# Patient Record
Sex: FEMALE | Race: WHITE | HISPANIC OR LATINO | Employment: FULL TIME | URBAN - METROPOLITAN AREA
[De-identification: names, ages, dates, MRNs, and addresses within clinical notes are randomized per-mention and may not be internally consistent; named-entity substitution may affect disease eponyms.]

---

## 2019-01-08 ENCOUNTER — HOSPITAL ENCOUNTER (EMERGENCY)
Facility: CLINIC | Age: 19
Discharge: HOME | End: 2019-01-09
Attending: PHYSICIAN ASSISTANT | Admitting: PHYSICIAN ASSISTANT
Payer: MEDICAID

## 2019-01-08 ENCOUNTER — HOSPITAL ENCOUNTER (EMERGENCY)
Facility: CLINIC | Age: 19
End: 2019-01-08

## 2019-01-08 DIAGNOSIS — R11.10 VOMITING AND DIARRHEA: ICD-10-CM

## 2019-01-08 DIAGNOSIS — R19.7 VOMITING AND DIARRHEA: ICD-10-CM

## 2019-01-08 LAB
ALBUMIN UR-MCNC: NEGATIVE MG/DL
ANION GAP SERPL CALCULATED.3IONS-SCNC: 10 MMOL/L (ref 3–14)
APPEARANCE UR: ABNORMAL
BACTERIA #/AREA URNS HPF: ABNORMAL /HPF
BASOPHILS # BLD AUTO: 0 10E9/L (ref 0–0.2)
BASOPHILS NFR BLD AUTO: 0.2 %
BILIRUB UR QL STRIP: NEGATIVE
BUN SERPL-MCNC: 9 MG/DL (ref 7–30)
CALCIUM SERPL-MCNC: 8.5 MG/DL (ref 8.5–10.1)
CHLORIDE SERPL-SCNC: 100 MMOL/L (ref 94–109)
CO2 SERPL-SCNC: 25 MMOL/L (ref 20–32)
COLOR UR AUTO: YELLOW
CREAT SERPL-MCNC: 0.61 MG/DL (ref 0.52–1.04)
DIFFERENTIAL METHOD BLD: ABNORMAL
EOSINOPHIL # BLD AUTO: 0 10E9/L (ref 0–0.7)
EOSINOPHIL NFR BLD AUTO: 0.2 %
ERYTHROCYTE [DISTWIDTH] IN BLOOD BY AUTOMATED COUNT: 13.2 % (ref 10–15)
GFR SERPL CREATININE-BSD FRML MDRD: >90 ML/MIN/{1.73_M2}
GLUCOSE SERPL-MCNC: 143 MG/DL (ref 70–99)
GLUCOSE UR STRIP-MCNC: NEGATIVE MG/DL
HCG UR QL: NEGATIVE
HCT VFR BLD AUTO: 43 % (ref 35–47)
HGB BLD-MCNC: 14.2 G/DL (ref 11.7–15.7)
HGB UR QL STRIP: ABNORMAL
IMM GRANULOCYTES # BLD: 0.1 10E9/L (ref 0–0.4)
IMM GRANULOCYTES NFR BLD: 0.6 %
KETONES UR STRIP-MCNC: NEGATIVE MG/DL
LEUKOCYTE ESTERASE UR QL STRIP: ABNORMAL
LYMPHOCYTES # BLD AUTO: 0.6 10E9/L (ref 0.8–5.3)
LYMPHOCYTES NFR BLD AUTO: 3.5 %
MCH RBC QN AUTO: 28 PG (ref 26.5–33)
MCHC RBC AUTO-ENTMCNC: 33 G/DL (ref 31.5–36.5)
MCV RBC AUTO: 85 FL (ref 78–100)
MONOCYTES # BLD AUTO: 0.4 10E9/L (ref 0–1.3)
MONOCYTES NFR BLD AUTO: 2 %
MUCOUS THREADS #/AREA URNS LPF: PRESENT /LPF
NEUTROPHILS # BLD AUTO: 16.7 10E9/L (ref 1.6–8.3)
NEUTROPHILS NFR BLD AUTO: 93.5 %
NITRATE UR QL: NEGATIVE
NRBC # BLD AUTO: 0 10*3/UL
NRBC BLD AUTO-RTO: 0 /100
PH UR STRIP: 6 PH (ref 5–7)
PLATELET # BLD AUTO: 319 10E9/L (ref 150–450)
POTASSIUM SERPL-SCNC: 3.7 MMOL/L (ref 3.4–5.3)
RBC # BLD AUTO: 5.08 10E12/L (ref 3.8–5.2)
RBC #/AREA URNS AUTO: 5 /HPF (ref 0–2)
SODIUM SERPL-SCNC: 135 MMOL/L (ref 133–144)
SOURCE: ABNORMAL
SP GR UR STRIP: 1.02 (ref 1–1.03)
SQUAMOUS #/AREA URNS AUTO: 5 /HPF (ref 0–1)
UROBILINOGEN UR STRIP-MCNC: 0 MG/DL (ref 0–2)
WBC # BLD AUTO: 17.8 10E9/L (ref 4–11)
WBC #/AREA URNS AUTO: 3 /HPF (ref 0–5)

## 2019-01-08 PROCEDURE — 25000128 H RX IP 250 OP 636: Performed by: PHYSICIAN ASSISTANT

## 2019-01-08 PROCEDURE — 80048 BASIC METABOLIC PNL TOTAL CA: CPT | Performed by: PHYSICIAN ASSISTANT

## 2019-01-08 PROCEDURE — 99284 EMERGENCY DEPT VISIT MOD MDM: CPT | Mod: 25

## 2019-01-08 PROCEDURE — 81001 URINALYSIS AUTO W/SCOPE: CPT | Performed by: PHYSICIAN ASSISTANT

## 2019-01-08 PROCEDURE — 81025 URINE PREGNANCY TEST: CPT | Performed by: PHYSICIAN ASSISTANT

## 2019-01-08 PROCEDURE — 96361 HYDRATE IV INFUSION ADD-ON: CPT

## 2019-01-08 PROCEDURE — 85025 COMPLETE CBC W/AUTO DIFF WBC: CPT | Performed by: PHYSICIAN ASSISTANT

## 2019-01-08 PROCEDURE — 96374 THER/PROPH/DIAG INJ IV PUSH: CPT

## 2019-01-08 RX ORDER — ONDANSETRON 2 MG/ML
4 INJECTION INTRAMUSCULAR; INTRAVENOUS ONCE
Status: COMPLETED | OUTPATIENT
Start: 2019-01-08 | End: 2019-01-08

## 2019-01-08 RX ADMIN — SODIUM CHLORIDE 1000 ML: 9 INJECTION, SOLUTION INTRAVENOUS at 23:38

## 2019-01-08 RX ADMIN — ONDANSETRON 4 MG: 2 INJECTION INTRAMUSCULAR; INTRAVENOUS at 21:34

## 2019-01-08 RX ADMIN — SODIUM CHLORIDE 1000 ML: 9 INJECTION, SOLUTION INTRAVENOUS at 21:34

## 2019-01-08 ASSESSMENT — ENCOUNTER SYMPTOMS
DIZZINESS: 0
VOMITING: 1
CHILLS: 1
FEVER: 0
LIGHT-HEADEDNESS: 0
HEADACHES: 1
BLOOD IN STOOL: 0
DYSURIA: 1
NAUSEA: 1
DIARRHEA: 1
ABDOMINAL PAIN: 1

## 2019-01-08 NOTE — ED AVS SNAPSHOT
[facility] Emergency Department  [address]  [facility] [address]  Phone:  [phone]  Fax:  [phone]                                    Marilu Lozoya   MRN: [id number]    Department:  [facility] Emergency Department   Date of Visit:  1/8/2019           After Visit Summary Signature Page    I have received my discharge instructions, and my questions have been answered. I have discussed any challenges I see with this plan with the nurse or doctor.    ..........................................................................................................................................  Patient/Patient Representative Signature      ..........................................................................................................................................  Patient Representative Print Name and Relationship to Patient    ..................................................               ................................................  Date                                   Time    ..........................................................................................................................................  Reviewed by Signature/Title    ...................................................              ..............................................  Date                                               Time          22EPIC Rev 08/18

## 2019-01-09 VITALS
RESPIRATION RATE: 18 BRPM | DIASTOLIC BLOOD PRESSURE: 60 MMHG | OXYGEN SATURATION: 98 % | HEART RATE: 111 BPM | TEMPERATURE: 98.2 F | SYSTOLIC BLOOD PRESSURE: 117 MMHG | WEIGHT: 249 LBS

## 2019-01-09 NOTE — ED PROVIDER NOTES
History     Chief Complaint:  Nausea, Vomiting, & Diarrhea      HPI   Marilu Lozoya is a 18 year old female who presents for evaluation of nausea, vomiting, and diarrhea. The patient reports that she has had diarrhea all day and vomited 5 times. No blood in stool or hematemesis. She has also had chills but no recorded fever. The patient has had abdominal pain today, dysuria, and currently has a headache but denies lightheadedness and dizziness. Of note, the patient has not had any sick contacts but did eat fast food prior to the onset of diarrhea.       Allergies:  No known drug allergies     Medications:    The patient is not currently taking any prescribed medications.    Past Medical History:    The patient does not have any past pertinent medical history.    Past Surgical History:    History reviewed. No pertinent surgical history.    Family History:    History reviewed. No pertinent family history.     Social History:  Smoking status: Never smoker  Alcohol use: Yes  Marital Status:  Single       Review of Systems   Constitutional: Positive for chills. Negative for fever.   Gastrointestinal: Positive for abdominal pain, diarrhea, nausea and vomiting. Negative for blood in stool.   Genitourinary: Positive for dysuria.   Neurological: Positive for headaches. Negative for dizziness and light-headedness.   All other systems reviewed and are negative.        Physical Exam     Patient Vitals for the past 24 hrs:   BP Temp Temp src Pulse Heart Rate Resp SpO2 Weight   01/09/19 0036 -- -- -- -- -- -- 99 % --   01/09/19 0025 117/60 -- -- 111 -- -- -- --   01/08/19 2230 -- -- -- 112 -- -- 100 % --   01/08/19 2225 -- -- -- 110 -- -- 100 % --   01/08/19 2215 -- -- -- 112 112 -- 99 % --   01/08/19 2145 -- -- -- -- -- -- 97 % --   01/08/19 2115 138/82 98.2  F (36.8  C) Oral -- 134 18 97 % 112.9 kg (249 lb)   01/08/19 2112 (!) 132/106 98.2  F (36.8  C) Oral -- -- 18 97 % 112.9 kg (249 lb)         Physical Exam    Constitutional: She is oriented to person, place, and time. She appears well-developed and well-nourished. No distress.   HENT:   Head: Normocephalic and atraumatic.   Mouth/Throat: Mucous membranes are normal.   Eyes: Conjunctivae are normal. No scleral icterus.   Neck: Normal range of motion.   Cardiovascular: Normal rate and regular rhythm.   Pulmonary/Chest: Effort normal and breath sounds normal. No respiratory distress.   Abdominal: Soft. She exhibits no distension. There is no tenderness. There is no rebound and no guarding.   Musculoskeletal: Normal range of motion. She exhibits no edema or deformity.   Neurological: She is alert and oriented to person, place, and time. No cranial nerve deficit.   Skin: Skin is warm and dry. She is not diaphoretic.   Psychiatric: She has a normal mood and affect. Her behavior is normal.         Emergency Department Course     Laboratory:  CBC: WBC 17.8, WNL ( HGB 14.2, )  BMP: Glucose 143, WNL (Creatinine 0.61)  HCG qualitative urine: Negative   UA: Blood moderate, Leukocyte esterase trace, RBC 5, Bacteria many, Squamous Epithelial 5, Mucous present, o/w negative     Interventions:  2134: Zofran 4 mg IV  2134: NaCl BOLUS 1000 ml IV  2338: NaCl BOLUS 1000 ml IV    Emergency Department Course:  Past medical records, nursing notes, and vitals reviewed.  2125: I performed an exam of the patient and obtained history, as documented above.     IV inserted and blood drawn.    2338: I rechecked the patient. Explained findings to the patient.    0039: I rechecked the patient.  Findings and plan explained to the Patient. Patient discharged home with instructions regarding supportive care, medications, and reasons to return. The importance of close follow-up was reviewed.       Impression & Plan      Medical Decision Making:  Marilu Lozoya is a 18 year old female who presents with acute nausea, vomiting and diarrhea. She has a benign abdominal exam without focal RLQ or LLQ  tenderness to suggest diverticulitis or appendicitis, respectively, or other acute abdominal process. I did discuss the sometimes vague initial constellation of symptoms early in the course of acute abdominal processes, and the need for immediate return should pain or other concerning symptoms develop.  Symptoms are improved after IV fluids and symptomatic treatment. The patient is not pregnant and there is no evidence of urinary tract infection. There has been no travel or antibiotic exposure to suggest more concerning cause of diarrhea, and there has been no hematemesis or BRBPR/melena. She was initially tachycardic on arrival, but this improved with IVF and I suspect was related to some mild dehydration. On reassessment, she is tolerating PO and continues to have a benign abdominal exam. I believe she is safe for discharge in improved condition at this time with strict return precautions for recurrent vomiting, pain, fever or any other concerning symptoms.      Critical Care time:  none    Diagnosis:    ICD-10-CM    1. Vomiting and diarrhea R11.10     R19.7        Disposition:  discharged to home    Discharge Medications:     Medication List      There are no discharge medications for this visit.           Thad Goss  1/8/2019   [facility] EMERGENCY DEPARTMENT    Scribe Disclosure:  I, Thad Goss, am serving as a scribe at 9:25 PM on 1/8/2019 to document services personally performed by Maria C Salazar PA-C based on my observations and the provider's statements to me.          Maria C Salazar PA-C  01/09/19 0041

## 2019-01-09 NOTE — ED TRIAGE NOTES
Pt presents with N/V/D starting this afternoon with ENRIQUEZ and associtated abd pain. Emesis x5. ABCs intact.

## 2019-08-28 ENCOUNTER — OFFICE VISIT (OUTPATIENT)
Dept: URGENT CARE | Facility: URGENT CARE | Age: 19
End: 2019-08-28
Payer: COMMERCIAL

## 2019-08-28 VITALS
DIASTOLIC BLOOD PRESSURE: 80 MMHG | OXYGEN SATURATION: 98 % | SYSTOLIC BLOOD PRESSURE: 122 MMHG | HEART RATE: 88 BPM | WEIGHT: 246 LBS | BODY MASS INDEX: 45.27 KG/M2 | HEIGHT: 62 IN | RESPIRATION RATE: 16 BRPM | TEMPERATURE: 97.7 F

## 2019-08-28 DIAGNOSIS — L03.90 CELLULITIS, UNSPECIFIED CELLULITIS SITE: ICD-10-CM

## 2019-08-28 DIAGNOSIS — E66.01 MORBID OBESITY (H): ICD-10-CM

## 2019-08-28 DIAGNOSIS — L60.0 INGROWN TOENAIL OF LEFT FOOT: Primary | ICD-10-CM

## 2019-08-28 PROCEDURE — 99203 OFFICE O/P NEW LOW 30 MIN: CPT | Performed by: PHYSICIAN ASSISTANT

## 2019-08-28 RX ORDER — CEPHALEXIN 500 MG/1
500 CAPSULE ORAL 4 TIMES DAILY
Qty: 40 CAPSULE | Refills: 0 | Status: SHIPPED | OUTPATIENT
Start: 2019-08-28 | End: 2019-10-07

## 2019-08-28 RX ORDER — FLUCONAZOLE 150 MG/1
150 TABLET ORAL
COMMUNITY
Start: 2019-05-14 | End: 2019-10-11

## 2019-08-28 RX ORDER — METFORMIN HCL 500 MG
500 TABLET, EXTENDED RELEASE 24 HR ORAL
COMMUNITY
Start: 2019-08-27 | End: 2024-02-25

## 2019-08-28 RX ORDER — ACETAMINOPHEN 325 MG/1
650 TABLET ORAL EVERY 6 HOURS PRN
Status: ON HOLD | COMMUNITY
Start: 2019-04-10 | End: 2019-10-15

## 2019-08-28 RX ORDER — ALBUTEROL SULFATE 90 UG/1
1-2 AEROSOL, METERED RESPIRATORY (INHALATION) EVERY 4 HOURS PRN
COMMUNITY
Start: 2019-03-01 | End: 2023-03-30

## 2019-08-28 RX ORDER — NORETHINDRONE ACETATE AND ETHINYL ESTRADIOL 1.5-30(21)
1 KIT ORAL
COMMUNITY
Start: 2019-05-14 | End: 2020-11-06

## 2019-08-28 ASSESSMENT — MIFFLIN-ST. JEOR: SCORE: 1834.1

## 2019-08-28 NOTE — PROGRESS NOTES
"SUBJECTIVE:  Chief Complaint   Patient presents with     Ingrown Toenail     L big toe has an ingrown toenail     Marilu Lozoya is a 19 year old female presents with a chief complaint of left great toe tenderness and localized pain  .  How: no know injury.  The patient complained of mild pain  and has not had decreased ROM.  Pain exacerbated by weight-bearing and movement.  Relieved by nothing.  She treated it initially with Tylenol. This is the first time this type of injury has occurred to this patient.     PMH  Obesity  Pre-diabetes    ALLERGIES  No Known Allergies     FAMILY HX  Diabetes  HTN  Obesity  Depression    Social History     Tobacco Use     Smoking status: Never Smoker     Smokeless tobacco: Never Used   Substance Use Topics     Alcohol use: Yes       ROS:  CONSTITUTIONAL:NEGATIVE for fever, chills, change in weight  INTEGUMENTARY/SKIN: POSITIVE for left and right lateral great toe erythema, swelling  ENT/MOUTH: NEGATIVE for ear, mouth and throat problems  RESP:NEGATIVE for significant cough or SOB  CV: NEGATIVE for chest pain, palpitations or peripheral edema  GI: NEGATIVE for nausea, abdominal pain, heartburn, or change in bowel habits  MUSCULOSKELETAL: POSITIVE for bilateral great toe inflammation and erythema  NEURO: NEGATIVE for weakness, dizziness or paresthesias    EXAM:   /80 (BP Location: Left arm, Patient Position: Sitting, Cuff Size: Adult Regular)   Pulse 88   Temp 97.7  F (36.5  C) (Oral)   Resp 16   Ht 1.575 m (5' 2\")   Wt 111.6 kg (246 lb)   SpO2 98%   BMI 44.99 kg/m    Gen: healthy,alert,no distress  Extremity: toe(s) great has erythema, swelling and point tenderness lateral edges.   There is not compromise to the distal circulation.  Pulses are +2 and CRT is brisk  EXTREMITIES: peripheral pulses normal  MS:  Positive for mild toe tenderness, erythema  SKIN: Positive for erythema, localized infection  NEURO: Normal strength and tone, sensory exam grossly normal, " mentation intact and speech normal  LYMPHATICS: negative for lymph node swelling      ASSESSMENT/PLAN      ICD-10-CM    1. Ingrown toenail of left foot L60.0 cephALEXin (KEFLEX) 500 MG capsule   2. Cellulitis, unspecified cellulitis site L03.90 cephALEXin (KEFLEX) 500 MG capsule   3. Morbid obesity (H) E66.01        Warm foot soaks  Motrin  Take keflex for infection  Advised to follow up with Podiatry to have toenail wedge resection with chemical treatment of matrix  Follow up with podiatry

## 2019-09-24 ENCOUNTER — OFFICE VISIT (OUTPATIENT)
Dept: PODIATRY | Facility: CLINIC | Age: 19
End: 2019-09-24
Payer: COMMERCIAL

## 2019-09-24 VITALS
SYSTOLIC BLOOD PRESSURE: 118 MMHG | WEIGHT: 243 LBS | DIASTOLIC BLOOD PRESSURE: 84 MMHG | HEIGHT: 62 IN | BODY MASS INDEX: 44.72 KG/M2

## 2019-09-24 DIAGNOSIS — L60.0 ONYCHOCRYPTOSIS: Primary | ICD-10-CM

## 2019-09-24 PROCEDURE — 11750 EXCISION NAIL&NAIL MATRIX: CPT | Mod: T5 | Performed by: PODIATRIST

## 2019-09-24 PROCEDURE — 99243 OFF/OP CNSLTJ NEW/EST LOW 30: CPT | Mod: 25 | Performed by: PODIATRIST

## 2019-09-24 RX ORDER — SILVER SULFADIAZINE 10 MG/G
CREAM TOPICAL
Qty: 85 G | Refills: 0 | Status: SHIPPED | OUTPATIENT
Start: 2019-09-24 | End: 2020-11-06

## 2019-09-24 ASSESSMENT — MIFFLIN-ST. JEOR: SCORE: 1820.49

## 2019-09-24 NOTE — PATIENT INSTRUCTIONS
Thank you for choosing [facility]!    DR. [facility]ODGSON[facility]S CLINIC LOCATIONS:   MONDAY - [place] TUESDAY - [place]   [address] [facility]  [address] [facility]   [place] [address] [place] [address]   [phone]       THURSDAY AM - [place] THURSDAY PM - [place]   [address]   [place] [address] [place] [address]   [phone]       FRIDAY AM - [place] SET UP SURGERY: [phone] [address] APPOINTMENTS: [phone]   [place] [address] BILLING QUESTIONS: [phone] FAX NUMBER: [phone]     [place] TOENAIL REMOVAL HOME INSTRUCTIONS  1. After the procedure, go home and elevate the foot/feet for the remainder of the day/evening as able. This is to minimize swelling, control pain, and limit post-procedural complications. The pre-procedural injection may cause your toe to be numb anywhere from 1-2 hours.    2. You can take Tylenol, Ibuprofen, Advil, etc as needed for pain if tolerated. Follow label instructions.     3. If you have been given a prescription for antibiotics, take them as instructed and complete the entire prescription.    4. Keep dressing intact until the following morning. Then remove the bandage (you may need to soak it in warm soapy water as the bandage will likely adhere to your skin).    5. Start soaking in warm soapy water for 5-10 minutes twice a day. Wash the toe thoroughly, dry the toe thoroughly. Apply antibiotic wound ointment to base of wound and cover with gauze and Coban dressing (not too tightly) until it stops draining. This may take a few days to weeks, but at that point, you may continue with antibiotic ointment and a band-aid, or you may stop applying a dressing all together. Dressing changes should be done twice daily if you had the permanent/chemical procedure done.    6. You may do activities as tolerated the following day. Find a shoe that is  "comfortable and minimizes the amount of rubbing on your toe, as this may increase pain, swelling, etc.    7. Monitor for signs of infection. With this procedure, it is common to have mild surrounding redness and drainage. If the redness involves the entire toe or if you notice red streaks on top of your foot, or if you experience any nausea, vomiting, chills, fevers > 101 degrees, call clinic for a quick appointment.      DIABETES AND YOUR FEET  Diabetes can result in several problems in the feet including ulcers (open sores) and amputations. Two of the most important reasons why people develop foot problems when they have diabetes is : 1. Neuropathy (loss of feeling)  2. Vascular disease (loss or decrease of blood flow).    Neuropathy is a term used to describe a loss of nerve function.  Patients with diabetes are at risk of developing neuropathy if their sugars continue to run high and are above the normal value. One theory for neuropathy is that the \"extra\" sugar in the body enters the nerves and is broken down. These by-products build up in the nerve causing it to swell and impairing nerve function. Often times, this can be prevented by controlling your sugars, dieting and exercise.    When a person develops neuropathy, they usually begin to feel numbness or tingling in their feet and sometime in their legs.  Other symptoms may include painful burning or hot feet, tingling or feeling like insects or ants are crawling on your feet or legs.  If the diabetes is sever and the sugars run high for long periods of time, neuropathy can also occur in the hands.    Vascular disease  is a term used to describe a loss or decrease in circulation (blood flow). There is a problem in getting blood and oxygen to areas that need it. Similar to neuropathy, sugars can build up in the walls of the arteries (blood vessels) and cause them to become swollen, thickened and hardened. This decreases the amount of blood that can go to an " area that needs it. Though this is common in the legs of diabetic patients, it can also affect other arteries (blood vessels) in the body such as in the heart and eyes.    In the legs, vascular disease usually results in cramping. Patients who develop leg cramps after walking the same distance every time (i.e. One block, half a mile, ect.) need to let their doctors know so that their circulation may be checked. Cramps causing severe pain in the feet and/or legs while sleeping and the cramps go away when you stand or hang your legs off the side of the bed, may also be a sign of poor blood circulation.  Occasional cramping in cold weather or on rare occasions with activity may not be due to poor circulation, but you should inform your doctor.    PREVENTION OF THESE DISEASES  The key to prevention is good blood sugar control. Poor blood sugar control is a big reason many of these problems start. Physical activity (exercise) is a very good way to help decrease your blood sugars. Exercise can lower your blood sugar, blood pressure, and cholesterol. It also reduces your risk for heart disease and stroke, relieves stress, and strengthens your heart, muscles and bones.  In addition, regular activity helps insulin work better, improves your blood circulation, and keeps your joints flexible. If you're trying to lose weight, a combination of exercise and wise food choices can help you reach your target weight and maintain it.      PAIN MANAGEMENT  1.Blood Sugar Control - Most important  2. Medications such as:  Amytriptylline, duloxetine, gabapentin, lyrica, tramadol  3. Nutritional therapy:  Vitamin B6 (100mg daily), Vitamin B12 (75mcg daily), Vitamin D 2000 IU daily), Alpha-Lipoic Acid (600-1800mg daily), Acetyl-L-Carnitine (500-1000mg TID, L-methyl folate (1500mcg daily)    ** Metformin can block Vitamin B6 and B12 so it is important to supplement**    FOOT CARE RECOMMENDATIONS   1. Wash your feet with lukewarm water and a  mild soap and then dry them thoroughly, especially between the toes.     2. Examine your feet daily looking for cuts, corns, blisters, cracks, ect, especially after wearing new shoes. Make sure to look between your toes. If you cannot see the bottom of your feet, set a mirror on the floor and hold your foot over it, or ask a spouse, friend or family member to examine your feet for you. Contact your doctor immediately if new problems are noted or if sores are not healing.     3. Immediately apply moisturizer to the tops and bottoms of your feet, avoiding areas between the toes. Hand lotion (Intesive Care, Sweetie, Eucerin, Neutrogena, Curel, ect) is sufficient unless your doctor prescribes a medicated lotion. Apply sunscreen to your feet when going swimming outside.     4. Use clean comfortable shoes, wear white socks (if you have any bleeding or drainage, you will see it on white socks). Socks should not have thick seams or cut off the circulation around the leg. Break in new shoes slowly and rotate with older shoes until broken in. Check the inside of your shoes with your hand to look for areas of irritation or objects that may have fallen into your shoes.       5. Keep slippers by the side of your bed for use during the night.     6.  Shoes should be fitted by a professional and should not cause areas of irritation.  Check your feet regularly when wearing a new pair of shoes and replace them as needed.     7.  Talk to your doctor about proper exercise. Exercise and stretching stimulate blood flow to your feet and maintain proper glucose levels.     8.  Monitor your blood glucose level as instructed by your doctor. Notify your doctor immediately if your blood sugar is abnormally high or low.    9. Cut your nails straight across, but then gently round any sharp edges with a cardboard nail file. If you have neuropathy, peripheral vascular disease or cannot see that well to trim your own toenails contact [employer]  ([phone]) or [employer] ([phone]).      THINGS TO AVOID DOING   1.  Do not soak your feet if you have an open sore. Use only lukewarm water and always check the temperature with your hand as hot water can easily burn your feet.       2.  Never use a hot water bottle or heating pad on your feet. Also do not apply cold compresses to your feet. With decreased sensation, you could burn or freeze your feet.       3.  Do not apply any of these to your feet:    -  Over the counter medicine for corns or warts    -  Harsh chemicals like boric acid    -  Do not self-treat corns, cuts, blisters or infections. Always consult your doctor.       4.  Do not wear sandals, slippers or walk barefoot, especially on hot sand or concrete or other harsh surfaces.     5.  If you smoke, stop!!!

## 2019-09-24 NOTE — PROGRESS NOTES
"Foot & Ankle Surgery  September 24, 2019    CC: \"engrown toe nails\"    I was asked to see Marilu Lozoya regarding the chief complaint by:  KANG CHAMBERS    HPI:  Pt is a 19 year old female who presents with above complaint.  Bilateral border bilateral hallux ingrown nails x 3 weeks.  No previous procedures, she's been able to remove the edges herself when symptomatic.  This time she's not able to do so.  Seen by KANG Weaver 8/28/19, put on Keflex, although didn't finish the course 2/2 to GI discomfort.  Advised soaking, Motrin and follow up with us.    ROS:   Pos for CC.  The patient denies current nausea, vomiting, chills, fevers, belly pain, calf pain, chest pain or SOB.  Complete remainder of ROS is otherwise neg.    VITALS:    Vitals:    09/24/19 0941   BP: 118/84   Weight: 110.2 kg (243 lb)   Height: 1.575 m (5' 2\")       PMH:  DMII    SXHX:  Neg per patient     MEDS:    Current Outpatient Medications   Medication     acetaminophen (TYLENOL) 325 MG tablet     albuterol (PROAIR HFA/PROVENTIL HFA/VENTOLIN HFA) 108 (90 Base) MCG/ACT inhaler     fluconazole (DIFLUCAN) 150 MG tablet     metFORMIN (GLUCOPHAGE-XR) 500 MG 24 hr tablet     norethindrone-ethinyl estradiol-iron (MICROGESTIN FE1.5/30) 1.5-30 MG-MCG tablet     cephALEXin (KEFLEX) 500 MG capsule     No current facility-administered medications for this visit.        ALL:   No Known Allergies    FMH:  Neg for foot problems, but history of DMII in grandparents    SocHx:    Social History     Socioeconomic History     Marital status: Single     Spouse name: Not on file     Number of children: Not on file     Years of education: Not on file     Highest education level: Not on file   Occupational History     Not on file   Social Needs     Financial resource strain: Not on file     Food insecurity:     Worry: Not on file     Inability: Not on file     Transportation needs:     Medical: Not on file     Non-medical: Not on file   Tobacco Use     Smoking status: " Never Smoker     Smokeless tobacco: Never Used   Substance and Sexual Activity     Alcohol use: Yes     Drug use: No     Sexual activity: Not on file   Lifestyle     Physical activity:     Days per week: Not on file     Minutes per session: Not on file     Stress: Not on file   Relationships     Social connections:     Talks on phone: Not on file     Gets together: Not on file     Attends [employer] service: Not on file     Active member of club or organization: Not on file     Attends meetings of clubs or organizations: Not on file     Relationship status: Not on file     Intimate partner violence:     Fear of current or ex partner: Not on file     Emotionally abused: Not on file     Physically abused: Not on file     Forced sexual activity: Not on file   Other Topics Concern     Not on file   Social History Narrative     Not on file           EXAMINATION:  Gen:   No apparent distress  Neuro:   A&Ox3, no deficits  Psych:    Answering questions appropriately for age and situation with normal affect  Head:    NCAT  Eye:    Visual scanning without deficit  Ear:    Response to auditory stimuli wnl  Lung:    Non-labored breathing on RA noted  Abd:    NTND per patient report  Lymph:    Neg for pitting/non-pitting edema BLE  Vasc:    Pulses palpable, CFT minimally delayed  Neuro:    Light touch sensation intact to all sensory nerve distributions without paresthesias  Derm:    Prominent onychocryptosis bilateral border bilateral hallux without paronychia/SOI, but very tender to palpation  MSK:    ROM, strength wnl without limitation, no pain on palpation noted.  Calf:    Neg for redness, swelling or tenderness    Assessment:  19 year old female with prominent onychocryptosis bilateral border bilateral great toe; DMII       Plan:  Discussed etiologies, anatomy and options  1.  prominentn onychocryptosis bilateral border bilateral hallux  -Regarding the ingrown nail, procedure options were discussed.  They elected to go with  Partial permanent avulsion.  See procedure note for details.  Risks that were discussed include but are not limited to infection, wound healing complications, nerve irritation, recurrence of the ingrown nail and the need for further procedures.  Antibiotic:  None needed  -Rx for silvadene for BID dressing changes    After discussing the procedure, as well as risks, complications and post-procedure instructions, informed consent was obtained.    Anesthesia:  10 cc's of  1% lidocaine plain total    Procedure:  After adequate prep, and with anesthesia achieved, a tourni-cot was applied to the involved toe(and removed after bandage application) and  attention was directed to the medial and lateral border of the left and right great toes where the nail plate was freed from surrounding soft tissue.  The offending border was  using an English Anvil and then removed in total.  The base of the wound was explored and showed no necrotic tissue, purulence or debris.  Phenol was then applied to the base of the wound for 30s x 3, and sufficient isopropyl alcohol was used to irrigate the wound.  A clean dressing was applied loosely to prevent vascular insult.  The patient tolerated the procedure well without complications.    Post-procedural instructions were dispensed and discussed with the patient.  All questions were answered.      2.  DMII   -Regarding the patient's diabetes, we dispensed and discussed proper diabetic foot care.  This includes closely monitoring their blood sugars, closely monitoring their blood pressures, and evaluating their feet at least once per day.  We also discussed potential problems associated with barefoot/sock ambulation and soaking their feet.           Follow up:  2 weeks or sooner with acute issues      Patient's medical history was reviewed today    Body mass index is 44.45 kg/m .  Weight management plan: Patient was referred to their PCP to discuss a diet and exercise  plan.        Abran Grier, CARLA FACFAS FACFAOM  Podiatric Foot & Ankle Surgeon  [facility]  [phone]

## 2019-10-04 ENCOUNTER — HOSPITAL ENCOUNTER (EMERGENCY)
Facility: CLINIC | Age: 19
Discharge: HOME | End: 2019-10-05
Attending: EMERGENCY MEDICINE | Admitting: EMERGENCY MEDICINE
Payer: COMMERCIAL

## 2019-10-04 DIAGNOSIS — K80.50 BILIARY COLIC: ICD-10-CM

## 2019-10-04 PROCEDURE — 96374 THER/PROPH/DIAG INJ IV PUSH: CPT

## 2019-10-04 PROCEDURE — 99285 EMERGENCY DEPT VISIT HI MDM: CPT | Mod: 25

## 2019-10-04 PROCEDURE — 96375 TX/PRO/DX INJ NEW DRUG ADDON: CPT

## 2019-10-04 PROCEDURE — 96376 TX/PRO/DX INJ SAME DRUG ADON: CPT

## 2019-10-04 PROCEDURE — 96361 HYDRATE IV INFUSION ADD-ON: CPT

## 2019-10-04 NOTE — ED AVS SNAPSHOT
[facility]  [address]  [facility] [address]  Phone:  [phone]  Fax:  [phone]                                    Marilu Lozoya   MRN: [id number]    Department:  [facility]   Date of Visit:  10/4/2019           After Visit Summary Signature Page    I have received my discharge instructions, and my questions have been answered. I have discussed any challenges I see with this plan with the nurse or doctor.    ..........................................................................................................................................  Patient/Patient Representative Signature      ..........................................................................................................................................  Patient Representative Print Name and Relationship to Patient    ..................................................               ................................................  Date                                   Time    ..........................................................................................................................................  Reviewed by Signature/Title    ...................................................              ..............................................  Date                                               Time          22EPIC Rev 08/18

## 2019-10-05 ENCOUNTER — APPOINTMENT (OUTPATIENT)
Dept: ULTRASOUND IMAGING | Facility: CLINIC | Age: 19
End: 2019-10-05
Attending: EMERGENCY MEDICINE
Payer: COMMERCIAL

## 2019-10-05 VITALS
OXYGEN SATURATION: 100 % | BODY MASS INDEX: 45.36 KG/M2 | WEIGHT: 248 LBS | HEART RATE: 69 BPM | TEMPERATURE: 97.3 F | RESPIRATION RATE: 18 BRPM | DIASTOLIC BLOOD PRESSURE: 75 MMHG | SYSTOLIC BLOOD PRESSURE: 133 MMHG

## 2019-10-05 LAB
ALBUMIN SERPL-MCNC: 3.8 G/DL (ref 3.4–5)
ALP SERPL-CCNC: 98 U/L (ref 40–150)
ALT SERPL W P-5'-P-CCNC: 67 U/L (ref 0–50)
ANION GAP SERPL CALCULATED.3IONS-SCNC: 8 MMOL/L (ref 3–14)
AST SERPL W P-5'-P-CCNC: 34 U/L (ref 0–45)
B-HCG FREE SERPL-ACNC: <5 IU/L
BASOPHILS # BLD AUTO: 0 10E9/L (ref 0–0.2)
BASOPHILS NFR BLD AUTO: 0.3 %
BILIRUB SERPL-MCNC: 0.3 MG/DL (ref 0.2–1.3)
BUN SERPL-MCNC: 8 MG/DL (ref 7–30)
CALCIUM SERPL-MCNC: 9.5 MG/DL (ref 8.5–10.1)
CHLORIDE SERPL-SCNC: 102 MMOL/L (ref 94–109)
CO2 SERPL-SCNC: 26 MMOL/L (ref 20–32)
CREAT SERPL-MCNC: 0.59 MG/DL (ref 0.52–1.04)
DIFFERENTIAL METHOD BLD: NORMAL
EOSINOPHIL # BLD AUTO: 0.1 10E9/L (ref 0–0.7)
EOSINOPHIL NFR BLD AUTO: 1.1 %
ERYTHROCYTE [DISTWIDTH] IN BLOOD BY AUTOMATED COUNT: 13.3 % (ref 10–15)
GFR SERPL CREATININE-BSD FRML MDRD: >90 ML/MIN/{1.73_M2}
GLUCOSE SERPL-MCNC: 135 MG/DL (ref 70–99)
HCT VFR BLD AUTO: 39 % (ref 35–47)
HGB BLD-MCNC: 12.3 G/DL (ref 11.7–15.7)
IMM GRANULOCYTES # BLD: 0.1 10E9/L (ref 0–0.4)
IMM GRANULOCYTES NFR BLD: 0.5 %
LIPASE SERPL-CCNC: 125 U/L (ref 73–393)
LYMPHOCYTES # BLD AUTO: 2.7 10E9/L (ref 0.8–5.3)
LYMPHOCYTES NFR BLD AUTO: 24.5 %
MCH RBC QN AUTO: 27 PG (ref 26.5–33)
MCHC RBC AUTO-ENTMCNC: 31.5 G/DL (ref 31.5–36.5)
MCV RBC AUTO: 86 FL (ref 78–100)
MONOCYTES # BLD AUTO: 0.6 10E9/L (ref 0–1.3)
MONOCYTES NFR BLD AUTO: 5.5 %
NEUTROPHILS # BLD AUTO: 7.5 10E9/L (ref 1.6–8.3)
NEUTROPHILS NFR BLD AUTO: 68.1 %
NRBC # BLD AUTO: 0 10*3/UL
NRBC BLD AUTO-RTO: 0 /100
PLATELET # BLD AUTO: 343 10E9/L (ref 150–450)
POTASSIUM SERPL-SCNC: 3.6 MMOL/L (ref 3.4–5.3)
PROT SERPL-MCNC: 8.9 G/DL (ref 6.8–8.8)
RBC # BLD AUTO: 4.55 10E12/L (ref 3.8–5.2)
SODIUM SERPL-SCNC: 136 MMOL/L (ref 133–144)
WBC # BLD AUTO: 11 10E9/L (ref 4–11)

## 2019-10-05 PROCEDURE — 25000125 ZZHC RX 250: Performed by: EMERGENCY MEDICINE

## 2019-10-05 PROCEDURE — 83690 ASSAY OF LIPASE: CPT | Performed by: EMERGENCY MEDICINE

## 2019-10-05 PROCEDURE — 80053 COMPREHEN METABOLIC PANEL: CPT | Performed by: EMERGENCY MEDICINE

## 2019-10-05 PROCEDURE — 76705 ECHO EXAM OF ABDOMEN: CPT

## 2019-10-05 PROCEDURE — 84702 CHORIONIC GONADOTROPIN TEST: CPT

## 2019-10-05 PROCEDURE — 25000128 H RX IP 250 OP 636: Performed by: EMERGENCY MEDICINE

## 2019-10-05 PROCEDURE — 25000132 ZZH RX MED GY IP 250 OP 250 PS 637: Performed by: EMERGENCY MEDICINE

## 2019-10-05 PROCEDURE — 85025 COMPLETE CBC W/AUTO DIFF WBC: CPT | Performed by: EMERGENCY MEDICINE

## 2019-10-05 RX ORDER — SODIUM CHLORIDE 9 MG/ML
1000 INJECTION, SOLUTION INTRAVENOUS CONTINUOUS
Status: DISCONTINUED | OUTPATIENT
Start: 2019-10-05 | End: 2019-10-05 | Stop reason: HOSPADM

## 2019-10-05 RX ORDER — ONDANSETRON 2 MG/ML
4 INJECTION INTRAMUSCULAR; INTRAVENOUS EVERY 30 MIN PRN
Status: DISCONTINUED | OUTPATIENT
Start: 2019-10-05 | End: 2019-10-05 | Stop reason: HOSPADM

## 2019-10-05 RX ORDER — ONDANSETRON 4 MG/1
4 TABLET, ORALLY DISINTEGRATING ORAL EVERY 8 HOURS PRN
Qty: 10 TABLET | Refills: 0 | Status: SHIPPED | OUTPATIENT
Start: 2019-10-05 | End: 2019-10-22

## 2019-10-05 RX ORDER — HYDROMORPHONE HYDROCHLORIDE 1 MG/ML
0.5 INJECTION, SOLUTION INTRAMUSCULAR; INTRAVENOUS; SUBCUTANEOUS
Status: DISCONTINUED | OUTPATIENT
Start: 2019-10-05 | End: 2019-10-05 | Stop reason: HOSPADM

## 2019-10-05 RX ORDER — OXYCODONE AND ACETAMINOPHEN 5; 325 MG/1; MG/1
1-2 TABLET ORAL EVERY 4 HOURS PRN
Qty: 12 TABLET | Refills: 0 | Status: ON HOLD | OUTPATIENT
Start: 2019-10-05 | End: 2019-10-15

## 2019-10-05 RX ORDER — OXYCODONE AND ACETAMINOPHEN 5; 325 MG/1; MG/1
1 TABLET ORAL ONCE
Status: COMPLETED | OUTPATIENT
Start: 2019-10-05 | End: 2019-10-05

## 2019-10-05 RX ADMIN — SODIUM CHLORIDE 1000 ML: 9 INJECTION, SOLUTION INTRAVENOUS at 00:20

## 2019-10-05 RX ADMIN — FAMOTIDINE 20 MG: 10 INJECTION, SOLUTION INTRAVENOUS at 00:35

## 2019-10-05 RX ADMIN — LIDOCAINE HYDROCHLORIDE 30 ML: 20 SOLUTION ORAL; TOPICAL at 00:50

## 2019-10-05 RX ADMIN — ONDANSETRON HYDROCHLORIDE 4 MG: 2 INJECTION, SOLUTION INTRAMUSCULAR; INTRAVENOUS at 00:30

## 2019-10-05 RX ADMIN — ONDANSETRON HYDROCHLORIDE 4 MG: 2 INJECTION, SOLUTION INTRAMUSCULAR; INTRAVENOUS at 01:55

## 2019-10-05 RX ADMIN — OXYCODONE HYDROCHLORIDE AND ACETAMINOPHEN 1 TABLET: 5; 325 TABLET ORAL at 02:42

## 2019-10-05 RX ADMIN — HYDROMORPHONE HYDROCHLORIDE 0.5 MG: 1 INJECTION, SOLUTION INTRAMUSCULAR; INTRAVENOUS; SUBCUTANEOUS at 01:58

## 2019-10-05 ASSESSMENT — ENCOUNTER SYMPTOMS
DIARRHEA: 1
VOMITING: 1
DYSURIA: 0
CONSTIPATION: 0
ABDOMINAL PAIN: 1
NAUSEA: 1

## 2019-10-05 NOTE — ED TRIAGE NOTES
Patient presents to ED due to RUQ pain that developed after eating [employer]. Now c/o nausea, vomiting    Denies diarrhea or fever     ABC intact

## 2019-10-05 NOTE — ED PROVIDER NOTES
History     Chief Complaint:    Abdominal Pain    The history is provided by the patient.      Marilu Lozoya is a 19 year old female with a history of hyperlipidemia who presents with epigastric and right upper quadrant abdominal pain. The patient reports the pain began around 1700 this evening after eating a cupcake and ice cream earlier. The abdominal pain is sharp and does not radiate elsewhere. The patient also reports nausea, vomiting, and one episode of diarrhea. The patient denies any constipation, dysuria, vaginal discharge, or chance of pregnancy.    Allergies:  No Known Drug Allergies     Medications:    Tylenol  Proair HFA  Keflex  Diflucan  Glucophage-XR  Microgestin  Silvadene  Junel FE    Past Medical History:    Prediabetes  Anxiety disorder  Reactive airway disease without complication  Menorrhagia  Morbid obesity  Hyperlipidemia    Past Surgical History:    The patient does not have any pertinent past surgical history.    Family History:    Depression - mother    Social History:  Negative for tobacco use - former smoker, quit 2018.  Positive for alcohol use.   Negative for drug use.  Marital Status:  Single [1]     Review of Systems   Gastrointestinal: Positive for abdominal pain, diarrhea, nausea and vomiting. Negative for constipation.   Genitourinary: Negative for dysuria and vaginal discharge.   All other systems reviewed and are negative.        Physical Exam     Patient Vitals for the past 24 hrs:   BP Temp Temp src Heart Rate Resp SpO2 Weight   10/05/19 0200 -- -- -- -- -- 100 % --   10/05/19 0145 139/80 -- -- -- -- -- --   10/05/19 0000 (!) 155/89 97.3  F (36.3  C) Oral 105 20 100 % 112.5 kg (248 lb)     Physical Exam    Constitutional:  Oriented to person, place, and time. Well appearing.  HENT:   Head:    Normocephalic.   Mouth/Throat:   Oropharynx is clear and moist.   Eyes:    EOM are normal. Pupils are equal, round, and reactive to light.   Neck:    Neck supple.   Cardiovascular:   Normal rate, regular rhythm and normal heart sounds.      Exam reveals no gallop and no friction rub.       No murmur heard.  Pulmonary/Chest:  Effort normal and breath sounds normal.      No respiratory distress. No wheezes. No rales.      No reproducible chest wall pain.  Abdominal:   Soft. No distension. Epigastric and RUQ tenderness. No rebound and no guarding. Negative Obrien's sign.  Musculoskeletal:  Normal range of motion.   Neurological:   Alert and oriented to person, place, and time.           Moves all 4 extremities spontaneously    Skin:    No rash noted. No pallor.     Emergency Department Course     Imaging:  Radiology findings were communicated with the patient who voiced understanding of the findings.    US Abdomen Limited:  1.  Cholelithiasis. The gallbladder wall is thickened at 4 mm, as per radiology.     Laboratory:  Laboratory findings were communicated with the patient who voiced understanding of the findings.    ISTAT HCG Quantitative Pregnancy POCT: <5.0  CBC: AWNL. (WBC 11.0, HGB 12.3, )   CMP: Glucose 135 H, protein total 8.9 H, ALT 67 H  o/w WNL (Creatinine 0.59)  Lipase: 125    Interventions:  0020: 0.9% sodium chloride BOLUS 1 L IV  0030: Zofran 4 mg IV  0035: Pepcid 20 mg IV  0050: Xylocaine & Mylanta GI Cocktail 30 mL PO  0155: Zofran 4 mg IV  0158: Dilaudid 0.5 mg IV  0242: Percocet 1 tablet PO     Emergency Department Course:  Past medical records, nursing notes, and vitals reviewed.    0011: I performed an exam of the patient as documented above.     IV was inserted and blood was drawn for laboratory testing, results above.    The patient was sent for a limited abdomen ultrasound while in the emergency department, results above.     0226: Patient rechecked and updated.  She is feeling much better.    I personally reviewed the laboratory and imaging results with the Patient and answered all related questions prior to discharge.    Findings and plan explained to the Patient.  Patient discharged home with instructions regarding supportive care, medications, and reasons to return. The importance of close follow-up was reviewed.      Impression & Plan     Medical Decision Making:    Marilu Lozoya is a 19 year old female who presents for evaluation of abdominal pain in the upper right quadrant.  This patient has symptoms consistent with gallstones and biliary colic.  Ultrasound has confirmed the presence of gallstones.  There is no clinical, laboratory, or ultrasound evidence of choledocholithiasis, gallstone pancreatitis, or ascending cholangitis.  I doubt perforated ulcer, diverticulitis, colitis.  Certainly also may have component of GERD or gastritis.  The patient will be prescribed analgesics. The patient was educated to avoid fatty foods.  The patient was told to return to ED for increasing pain, vomiting, chills, sweats, or fever.  Follow up with general surgery is indicated for outpatient consultation, this may be arranged as soon as able.  See primary care doctor this week for recheck.      Discharge Diagnosis:    ICD-10-CM   1. Biliary colic K80.50     Disposition:  Discharged to home.    Discharge Medications:  New Prescriptions    ONDANSETRON (ZOFRAN ODT) 4 MG ODT TAB    Take 1 tablet (4 mg) by mouth every 8 hours as needed for nausea    OXYCODONE-ACETAMINOPHEN (PERCOCET) 5-325 MG TABLET    Take 1-2 tablets by mouth every 4 hours as needed for pain     Scribe Disclosure:  Cha MCKEON, am serving as a scribe at 12:17 AM on 10/5/2019 to document services personally performed by Carlos Alberto Rodrigues MD based on my observations and the provider's statements to me.     Cha Bashir  10/4/2019   [facility] EMERGENCY DEPARTMENT       Carlos Alberto Rodrigues MD  10/06/19 0029

## 2019-10-05 NOTE — DISCHARGE INSTRUCTIONS
Discharge Instructions  Biliary Colic    You have been seen today for biliary colic. Biliary colic is the pain that happens when gallstones block the normal flow of bile from the gallbladder.  It usually is a steady or crampy pain in the upper abdomen, most often under the right side of the rib cage where the gallbladder is. Sometimes you get pain from the gallbladder in your back or shoulder. It is common to have nausea and vomiting with biliary colic.    Bile is a liquid the body makes to help with digesting fat.  It is made by the liver and stored in the gallbladder and released from the gall bladder when you eat fatty foods. Gallstones can form for a variety of reasons. Risk factors for gallstones include being female, having a family history of gallstones, being older, being pregnant or having been pregnant, having diabetes, having rapid weight loss, and others.      Once gallstones form, surgeons usually tell you to have your gallbladder removed. There is medicine that can dissolve gallstones, but it can be unpleasant to take, and gallstones tend to come back when you quit taking the medicine. Your regular physician can help decide on the right treatment for you, and may refer you to a surgeon to discuss whether surgery is right in your case.     Complications of gallstones include infection, jaundice, inflammation of the pancreas, and rupture of the gallbladder. One of these complications will happen to about one out of every four patients with gallstones over the next 10-20 years if they are not treated.       Return to the Emergency Department if you develop:  Fever greater than 100.5 degrees Fahrenheit, or 38.1 degrees Celsius.  Persistent nausea and vomiting.  Pain that won t go away with the medicines you were given here.  Yellow skin or eye color (jaundice).  Other new concerning symptoms.    What can I do to help myself?  Eat regular meals at least three times a day, to make the gallbladder empty  before it gets too full.  Avoid fatty foods.  Drink plenty of clear fluids.  Take over the counter or prescribed pain medications.    See your doctor within 1 week (or as directed by your doctor today) for follow up.            If you were given a prescription for medicine here today, be sure to read all of the information (including the package insert) that comes with your prescription.  This will include important information about the medicine, its side effects, and any warnings that you need to know about.  The pharmacist who fills the prescription can provide more information and answer questions you may have about the medicine.  If you have questions or concerns that the pharmacist cannot address, please call or return to the Emergency Department.   Opioid Medication Information    Pain medications are among the most commonly prescribed medicines, so we are including this information for all our patients. If you did not receive pain medication or get a prescription for pain medicine, you can ignore it.     You may have been given a prescription for an opioid (narcotic) pain medicine and/or have received a pain medicine while here in the Emergency Department. These medicines can make you drowsy or impaired. You must not drive, operate dangerous equipment, or engage in any other dangerous activities while taking these medications. If you drive while taking these medications, you could be arrested for DUI, or driving under the influence. Do not drink any alcohol while you are taking these medications.     Opioid pain medications can cause addiction. If you have a history of chemical dependency of any type, you are at a higher risk of becoming addicted to pain medications.  Only take these prescribed medications to treat your pain when all other options have been tried. Take it for as short a time and as few doses as possible. Store your pain pills in a secure place, as they are frequently stolen and provide a  dangerous opportunity for children or visitors in your house to start abusing these powerful medications. We will not replace any lost or stolen medicine.  As soon as your pain is better, you should flush all your remaining medication.     Many prescription pain medications contain Tylenol  (acetaminophen), including Vicodin , Tylenol #3 , Norco , Lortab , and Percocet .  You should not take any extra pills of Tylenol  if you are using these prescription medications or you can get very sick.  Do not ever take more than 3000 mg of acetaminophen in any 24 hour period.    All opioids tend to cause constipation. Drink plenty of water and eat foods that have a lot of fiber, such as fruits, vegetables, prune juice, apple juice and high fiber cereal.  Take a laxative if you don t move your bowels at least every other day. Miralax , Milk of Magnesia, Colace , or Senna  can be used to keep you regular.      Remember that you can always come back to the Emergency Department if you are not able to see your regular doctor in the amount of time listed above, if you get any new symptoms, or if there is anything that worries you.

## 2019-10-07 ENCOUNTER — OFFICE VISIT (OUTPATIENT)
Dept: SURGERY | Facility: CLINIC | Age: 19
End: 2019-10-07
Payer: COMMERCIAL

## 2019-10-07 ENCOUNTER — PREP FOR PROCEDURE (OUTPATIENT)
Dept: SURGERY | Facility: CLINIC | Age: 19
End: 2019-10-07

## 2019-10-07 VITALS
OXYGEN SATURATION: 99 % | DIASTOLIC BLOOD PRESSURE: 82 MMHG | HEIGHT: 62 IN | BODY MASS INDEX: 45.64 KG/M2 | SYSTOLIC BLOOD PRESSURE: 124 MMHG | HEART RATE: 85 BPM | WEIGHT: 248 LBS | RESPIRATION RATE: 16 BRPM

## 2019-10-07 DIAGNOSIS — K80.20 CALCULUS OF GALLBLADDER WITHOUT CHOLECYSTITIS WITHOUT OBSTRUCTION: Primary | ICD-10-CM

## 2019-10-07 PROCEDURE — 99204 OFFICE O/P NEW MOD 45 MIN: CPT | Performed by: SURGERY

## 2019-10-07 ASSESSMENT — MIFFLIN-ST. JEOR: SCORE: 1843.17

## 2019-10-07 NOTE — LETTER
[facility]    [address], [address]  [place], [place] [address]  Phone [phone]  Fax [phone]    [address], [address]  [place] [address]  Phone [phone]  Fax [phone]    [email or web address]   October 7, 2019      Mariluadi Lozoya  [address] [place] [address]      We realize with scheduling surgery, one of your first questions is, how much will this cost?  Below we have provided you with the information you will need to receive an estimate for your surgery.    You are scheduled for the following procedure:  Laparoscopic Cholecystectomy      Surgeon:  Dr. Martel      Physician Assistant:  Yes      Please make sure to have your insurance card available at the time of calling.    Surgeon & Physician Assistant charges and facility charges for [facility], [facility] or [facility]:    Consumer Price Line at [phone]   -  It is important to note that there may be a Physician Assistant assisting with your surgery.  Please be sure to mention this when calling for the estimate.      Facility Charges at [facility], [facility] or [facility]:  [facility] at [phone]  [facility] at [phone]  [facility] at [phone] or [phone]    Anesthesiologist Charges:  [facility] at [phone]    [facility] - Nurse Anesthetist Charges:  [facility] at [phone]

## 2019-10-07 NOTE — PROGRESS NOTES
[facility]  New Patient Office Visit      Marilu Lozoya is a 19 year old female seen in consultation for Abdominal pain at the request of Carlos Alberto Rodrigues MD      Assessment and Plan:  It is my impression that Marilu has symptomatic gallstones.   I have offered her a laparoscopic cholecystectomy.      We have discussed the indication, alternatives, risks and expected recovery.  Specifically we have discussed incisions, scarring, postoperative infections, anesthesia, bleeding, blood transfusion, open conversion, common bile duct injury, injury to intra-abdominal organs, adhesions that can lead to bowel obstruction, retained common bile duct stone, bile leak, DVT, PE, hernia, post cholecystectomy diarrhea, postoperative dietary restrictions and physical limitations.  We have discussed the recommended interventions and treatments for these complications.  All questions have been answered to the best of my ability.           We will schedule surgery at the patient's convenience.      Chief complaint:  Abdominal pain    HPI:  Marilu Lozoya is a 19 year old female who presents with intermittent right upper quadrant pain one day last week. Was seen in ED and the pain improved. Never had it before this. The pain is associated with eating fatty foods.  Positive for associated symptoms of anorexia, nausea and vomiting.  Negative for associated symptoms of fever and chills.  She does not have a history of jaundice or dark urine.  She  has not had pancreatitis in the past.      Traveling at the end of the month so hoping to get surgery soon.    Past Medical History:   has no past medical history on file.   Prediabetes on metformin    Past Surgical History:  No past surgical history on file.    Social History:  Social History     Tobacco Use     Smoking status: Never Smoker     Smokeless tobacco: Never Used   Substance Use Topics     Alcohol use: Yes     Drug use: No        Family History:  No family history on  "file.  No FH reactions to anesthesia  Grandparent had colon cancer    Review of Systems:  The 10 point review of systems is negative other than noted in the HPI and above.    Physical Exam:  Vitals: /82   Pulse 85   Resp 16   Ht 1.575 m (5' 2\")   Wt 112.5 kg (248 lb)   SpO2 99%   BMI 45.36 kg/m    BMI= Body mass index is 45.36 kg/m .  General - Well developed, well nourished female in no apparent distress  HEENT:  Hpupils equal and round, conjunctivae clear, no scleral icterus, external  nose normal  Pulmonary: no dyspnea  CV: Regular pulse  Abdomen: soft, obese, non-distended   Musculoskeletal:  Moves all extremities equally, arm without edema  Neurologic: alert, speech is clear, nonfocal  Psychiatric: Mood and affect appropriate  Skin: Without lesions, rashes or jaundice    Relevant labs:    WBC -   Lab Results   Component Value Date    WBC 11.0 10/05/2019       HgB -   Lab Results   Component Value Date    HGB 12.3 10/05/2019       Plt-   Lab Results   Component Value Date     10/05/2019       Liver Function Studies -   Recent Labs   Lab Test 10/05/19  0022   PROTTOTAL 8.9*   ALBUMIN 3.8   BILITOTAL 0.3   ALKPHOS 98   AST 34   ALT 67*       Lipase-   Lab Results   Component Value Date    LIPASE 125 10/05/2019           Imaging:  All imaging studies reviewed by me.    Ultrasound shows: positive cholelithiasis, positive gallbladder wall thickening, negative ductal dilatation, negative pericholecystic fluid, negative sonographic Obrien's sign.    Recent Results (from the past 744 hour(s))   US Abdomen Limited    Narrative    EXAM: US ABDOMEN LIMITED  LOCATION: [facility]  DATE/TIME: 10/5/2019 1:22 AM    INDICATION: Right upper quadrant pain  COMPARISON: None  TECHNIQUE: Limited abdominal ultrasound.    FINDINGS:    GALLBLADDER: Gallbladder wall is thickened. Multiple gallstones.      BILE DUCTS: No biliary dilatation. The common duct measures 4 mm.    LIVER: Normal parenchyma with " smooth contour. No focal mass.    RIGHT KIDNEY: No hydronephrosis.    PANCREAS: The visualized portions are normal.    No ascites.      Impression    IMPRESSION:  1.  Cholelithiasis. The gallbladder wall is thickened at 4 mm.         This note was created using voice recognition software. Undetected word substitutions or other errors may have occurred.     Time spent with the patient with greater that 50% of the time in discussion was 25 minutes.     Michelle Martel MD  [facility], [place]    Please route or send letter to:  *None*

## 2019-10-08 ENCOUNTER — TELEPHONE (OUTPATIENT)
Dept: SURGERY | Facility: CLINIC | Age: 19
End: 2019-10-08

## 2019-10-08 NOTE — TELEPHONE ENCOUNTER
Type of surgery: LAPAROSCOPIC CHOLECYSTECTOMY   Location of surgery: [facility]  Date and time of surgery: 10-15-19, 9:30 AM   Surgeon: DR. WORTHINGTON   Pre-Op Appt Date: 10/4/19  Post-Op Appt Date: PATIENT TO SCHEDULE    Packet sent out: GIVEN TO PATIENT   Pre-cert/Authorization completed:  Not Applicable  Date: 10-8-19       LAPAROSCOPIC CHOLECYSTECTOMY WITH CHOLANGIOGRAMS   GENERAL   H&P DONE [facility] ER 10/4   90 MINS REQ   PA ASSIST JLS  ALW

## 2019-10-15 ENCOUNTER — HOSPITAL ENCOUNTER (OUTPATIENT)
Facility: CLINIC | Age: 19
Discharge: HOME | End: 2019-10-15
Attending: SURGERY | Admitting: SURGERY
Payer: COMMERCIAL

## 2019-10-15 ENCOUNTER — ANESTHESIA EVENT (OUTPATIENT)
Dept: SURGERY | Facility: CLINIC | Age: 19
End: 2019-10-15
Payer: COMMERCIAL

## 2019-10-15 ENCOUNTER — ANESTHESIA (OUTPATIENT)
Dept: SURGERY | Facility: CLINIC | Age: 19
End: 2019-10-15
Payer: COMMERCIAL

## 2019-10-15 ENCOUNTER — APPOINTMENT (OUTPATIENT)
Dept: SURGERY | Facility: PHYSICIAN GROUP | Age: 19
End: 2019-10-15
Payer: COMMERCIAL

## 2019-10-15 VITALS
BODY MASS INDEX: 44.81 KG/M2 | TEMPERATURE: 98.4 F | WEIGHT: 243.5 LBS | DIASTOLIC BLOOD PRESSURE: 79 MMHG | RESPIRATION RATE: 12 BRPM | HEART RATE: 77 BPM | HEIGHT: 62 IN | OXYGEN SATURATION: 96 % | SYSTOLIC BLOOD PRESSURE: 119 MMHG

## 2019-10-15 DIAGNOSIS — K80.20 CALCULUS OF GALLBLADDER WITHOUT CHOLECYSTITIS WITHOUT OBSTRUCTION: ICD-10-CM

## 2019-10-15 LAB
GLUCOSE BLDC GLUCOMTR-MCNC: 106 MG/DL (ref 70–99)
GLUCOSE BLDC GLUCOMTR-MCNC: 137 MG/DL (ref 70–99)
HCG UR QL: NEGATIVE
INTERPRETATION ECG - MUSE: NORMAL

## 2019-10-15 PROCEDURE — 82962 GLUCOSE BLOOD TEST: CPT

## 2019-10-15 PROCEDURE — 36000058 ZZH SURGERY LEVEL 3 EA 15 ADDTL MIN: Performed by: SURGERY

## 2019-10-15 PROCEDURE — 37000008 ZZH ANESTHESIA TECHNICAL FEE, 1ST 30 MIN: Performed by: SURGERY

## 2019-10-15 PROCEDURE — 25000125 ZZHC RX 250: Performed by: SURGERY

## 2019-10-15 PROCEDURE — 71000012 ZZH RECOVERY PHASE 1 LEVEL 1 FIRST HR: Performed by: SURGERY

## 2019-10-15 PROCEDURE — 93010 ELECTROCARDIOGRAM REPORT: CPT | Performed by: INTERNAL MEDICINE

## 2019-10-15 PROCEDURE — 71000013 ZZH RECOVERY PHASE 1 LEVEL 1 EA ADDTL HR: Performed by: SURGERY

## 2019-10-15 PROCEDURE — 88304 TISSUE EXAM BY PATHOLOGIST: CPT | Mod: 26 | Performed by: SURGERY

## 2019-10-15 PROCEDURE — 47562 LAPAROSCOPIC CHOLECYSTECTOMY: CPT | Performed by: SURGERY

## 2019-10-15 PROCEDURE — 25000128 H RX IP 250 OP 636: Performed by: NURSE ANESTHETIST, CERTIFIED REGISTERED

## 2019-10-15 PROCEDURE — 25000125 ZZHC RX 250: Performed by: NURSE ANESTHETIST, CERTIFIED REGISTERED

## 2019-10-15 PROCEDURE — 36000056 ZZH SURGERY LEVEL 3 1ST 30 MIN: Performed by: SURGERY

## 2019-10-15 PROCEDURE — 25000128 H RX IP 250 OP 636: Performed by: SURGERY

## 2019-10-15 PROCEDURE — 40000306 ZZH STATISTIC PRE PROC ASSESS II: Performed by: SURGERY

## 2019-10-15 PROCEDURE — 25800030 ZZH RX IP 258 OP 636: Performed by: ANESTHESIOLOGY

## 2019-10-15 PROCEDURE — 47562 LAPAROSCOPIC CHOLECYSTECTOMY: CPT | Mod: AS | Performed by: PHYSICIAN ASSISTANT

## 2019-10-15 PROCEDURE — 71000027 ZZH RECOVERY PHASE 2 EACH 15 MINS: Performed by: SURGERY

## 2019-10-15 PROCEDURE — 37000009 ZZH ANESTHESIA TECHNICAL FEE, EACH ADDTL 15 MIN: Performed by: SURGERY

## 2019-10-15 PROCEDURE — 27210794 ZZH OR GENERAL SUPPLY STERILE: Performed by: SURGERY

## 2019-10-15 PROCEDURE — 81025 URINE PREGNANCY TEST: CPT | Performed by: ANESTHESIOLOGY

## 2019-10-15 PROCEDURE — 88304 TISSUE EXAM BY PATHOLOGIST: CPT | Performed by: SURGERY

## 2019-10-15 PROCEDURE — 25000132 ZZH RX MED GY IP 250 OP 250 PS 637: Performed by: SURGERY

## 2019-10-15 PROCEDURE — 25000128 H RX IP 250 OP 636: Performed by: ANESTHESIOLOGY

## 2019-10-15 RX ORDER — OXYCODONE HYDROCHLORIDE 5 MG/1
5-10 TABLET ORAL EVERY 4 HOURS PRN
Qty: 20 TABLET | Refills: 0 | Status: SHIPPED | OUTPATIENT
Start: 2019-10-15 | End: 2019-10-22

## 2019-10-15 RX ORDER — AMOXICILLIN 250 MG
1-2 CAPSULE ORAL 2 TIMES DAILY
Qty: 30 TABLET | Refills: 0 | Status: SHIPPED | OUTPATIENT
Start: 2019-10-15 | End: 2019-10-22

## 2019-10-15 RX ORDER — PROPOFOL 10 MG/ML
INJECTION, EMULSION INTRAVENOUS CONTINUOUS PRN
Status: DISCONTINUED | OUTPATIENT
Start: 2019-10-15 | End: 2019-10-15

## 2019-10-15 RX ORDER — CEFAZOLIN SODIUM 1 G/3ML
1 INJECTION, POWDER, FOR SOLUTION INTRAMUSCULAR; INTRAVENOUS SEE ADMIN INSTRUCTIONS
Status: DISCONTINUED | OUTPATIENT
Start: 2019-10-15 | End: 2019-10-15 | Stop reason: HOSPADM

## 2019-10-15 RX ORDER — LIDOCAINE 40 MG/G
CREAM TOPICAL
Status: DISCONTINUED | OUTPATIENT
Start: 2019-10-15 | End: 2019-10-15 | Stop reason: HOSPADM

## 2019-10-15 RX ORDER — SODIUM CHLORIDE, SODIUM LACTATE, POTASSIUM CHLORIDE, CALCIUM CHLORIDE 600; 310; 30; 20 MG/100ML; MG/100ML; MG/100ML; MG/100ML
INJECTION, SOLUTION INTRAVENOUS CONTINUOUS
Status: DISCONTINUED | OUTPATIENT
Start: 2019-10-15 | End: 2019-10-15 | Stop reason: HOSPADM

## 2019-10-15 RX ORDER — ACETAMINOPHEN 325 MG/1
650 TABLET ORAL EVERY 6 HOURS PRN
Qty: 60 TABLET | Refills: 1 | Status: SHIPPED | OUTPATIENT
Start: 2019-10-15 | End: 2020-11-06

## 2019-10-15 RX ORDER — ONDANSETRON 4 MG/1
4 TABLET, ORALLY DISINTEGRATING ORAL EVERY 30 MIN PRN
Status: DISCONTINUED | OUTPATIENT
Start: 2019-10-15 | End: 2019-10-15 | Stop reason: HOSPADM

## 2019-10-15 RX ORDER — NALOXONE HYDROCHLORIDE 0.4 MG/ML
.1-.4 INJECTION, SOLUTION INTRAMUSCULAR; INTRAVENOUS; SUBCUTANEOUS
Status: DISCONTINUED | OUTPATIENT
Start: 2019-10-15 | End: 2019-10-15 | Stop reason: HOSPADM

## 2019-10-15 RX ORDER — FENTANYL CITRATE 50 UG/ML
25-50 INJECTION, SOLUTION INTRAMUSCULAR; INTRAVENOUS
Status: DISCONTINUED | OUTPATIENT
Start: 2019-10-15 | End: 2019-10-15 | Stop reason: HOSPADM

## 2019-10-15 RX ORDER — LIDOCAINE HYDROCHLORIDE 10 MG/ML
INJECTION, SOLUTION INFILTRATION; PERINEURAL PRN
Status: DISCONTINUED | OUTPATIENT
Start: 2019-10-15 | End: 2019-10-15

## 2019-10-15 RX ORDER — DIMENHYDRINATE 50 MG/ML
25 INJECTION, SOLUTION INTRAMUSCULAR; INTRAVENOUS
Status: DISCONTINUED | OUTPATIENT
Start: 2019-10-15 | End: 2019-10-15 | Stop reason: HOSPADM

## 2019-10-15 RX ORDER — BUPIVACAINE HYDROCHLORIDE AND EPINEPHRINE 2.5; 5 MG/ML; UG/ML
INJECTION, SOLUTION INFILTRATION; PERINEURAL PRN
Status: DISCONTINUED | OUTPATIENT
Start: 2019-10-15 | End: 2019-10-15 | Stop reason: HOSPADM

## 2019-10-15 RX ORDER — MEPERIDINE HYDROCHLORIDE 50 MG/ML
12.5 INJECTION INTRAMUSCULAR; INTRAVENOUS; SUBCUTANEOUS
Status: DISCONTINUED | OUTPATIENT
Start: 2019-10-15 | End: 2019-10-15 | Stop reason: HOSPADM

## 2019-10-15 RX ORDER — FENTANYL CITRATE 50 UG/ML
INJECTION, SOLUTION INTRAMUSCULAR; INTRAVENOUS PRN
Status: DISCONTINUED | OUTPATIENT
Start: 2019-10-15 | End: 2019-10-15

## 2019-10-15 RX ORDER — HYDROMORPHONE HYDROCHLORIDE 1 MG/ML
.3-.5 INJECTION, SOLUTION INTRAMUSCULAR; INTRAVENOUS; SUBCUTANEOUS EVERY 10 MIN PRN
Status: DISCONTINUED | OUTPATIENT
Start: 2019-10-15 | End: 2019-10-15 | Stop reason: HOSPADM

## 2019-10-15 RX ORDER — ACETAMINOPHEN 325 MG/1
650 TABLET ORAL
Status: DISCONTINUED | OUTPATIENT
Start: 2019-10-15 | End: 2019-10-15 | Stop reason: HOSPADM

## 2019-10-15 RX ORDER — ONDANSETRON 2 MG/ML
INJECTION INTRAMUSCULAR; INTRAVENOUS PRN
Status: DISCONTINUED | OUTPATIENT
Start: 2019-10-15 | End: 2019-10-15

## 2019-10-15 RX ORDER — KETOROLAC TROMETHAMINE 30 MG/ML
INJECTION, SOLUTION INTRAMUSCULAR; INTRAVENOUS PRN
Status: DISCONTINUED | OUTPATIENT
Start: 2019-10-15 | End: 2019-10-15

## 2019-10-15 RX ORDER — PROPOFOL 10 MG/ML
INJECTION, EMULSION INTRAVENOUS PRN
Status: DISCONTINUED | OUTPATIENT
Start: 2019-10-15 | End: 2019-10-15

## 2019-10-15 RX ORDER — ONDANSETRON 2 MG/ML
4 INJECTION INTRAMUSCULAR; INTRAVENOUS EVERY 30 MIN PRN
Status: DISCONTINUED | OUTPATIENT
Start: 2019-10-15 | End: 2019-10-15 | Stop reason: HOSPADM

## 2019-10-15 RX ORDER — METOPROLOL TARTRATE 1 MG/ML
1-2 INJECTION, SOLUTION INTRAVENOUS EVERY 5 MIN PRN
Status: DISCONTINUED | OUTPATIENT
Start: 2019-10-15 | End: 2019-10-15 | Stop reason: HOSPADM

## 2019-10-15 RX ORDER — OXYCODONE HYDROCHLORIDE 5 MG/1
5 TABLET ORAL
Status: COMPLETED | OUTPATIENT
Start: 2019-10-15 | End: 2019-10-15

## 2019-10-15 RX ORDER — NEOSTIGMINE METHYLSULFATE 1 MG/ML
VIAL (ML) INJECTION PRN
Status: DISCONTINUED | OUTPATIENT
Start: 2019-10-15 | End: 2019-10-15

## 2019-10-15 RX ORDER — IBUPROFEN 600 MG/1
600 TABLET, FILM COATED ORAL EVERY 6 HOURS PRN
Qty: 60 TABLET | Refills: 1 | Status: SHIPPED | OUTPATIENT
Start: 2019-10-15 | End: 2020-11-06

## 2019-10-15 RX ORDER — DEXAMETHASONE SODIUM PHOSPHATE 4 MG/ML
INJECTION, SOLUTION INTRA-ARTICULAR; INTRALESIONAL; INTRAMUSCULAR; INTRAVENOUS; SOFT TISSUE PRN
Status: DISCONTINUED | OUTPATIENT
Start: 2019-10-15 | End: 2019-10-15

## 2019-10-15 RX ORDER — ALBUTEROL SULFATE 0.83 MG/ML
2.5 SOLUTION RESPIRATORY (INHALATION) EVERY 4 HOURS PRN
Status: DISCONTINUED | OUTPATIENT
Start: 2019-10-15 | End: 2019-10-15 | Stop reason: HOSPADM

## 2019-10-15 RX ORDER — CEFAZOLIN SODIUM 2 G/100ML
2 INJECTION, SOLUTION INTRAVENOUS
Status: COMPLETED | OUTPATIENT
Start: 2019-10-15 | End: 2019-10-15

## 2019-10-15 RX ORDER — GLYCOPYRROLATE 0.2 MG/ML
INJECTION, SOLUTION INTRAMUSCULAR; INTRAVENOUS PRN
Status: DISCONTINUED | OUTPATIENT
Start: 2019-10-15 | End: 2019-10-15

## 2019-10-15 RX ADMIN — OXYCODONE HYDROCHLORIDE 5 MG: 5 TABLET ORAL at 15:08

## 2019-10-15 RX ADMIN — HYDROMORPHONE HYDROCHLORIDE 0.5 MG: 1 INJECTION, SOLUTION INTRAMUSCULAR; INTRAVENOUS; SUBCUTANEOUS at 13:10

## 2019-10-15 RX ADMIN — LIDOCAINE HYDROCHLORIDE 30 MG: 10 INJECTION, SOLUTION INFILTRATION; PERINEURAL at 09:49

## 2019-10-15 RX ADMIN — FENTANYL CITRATE 150 MCG: 50 INJECTION, SOLUTION INTRAMUSCULAR; INTRAVENOUS at 09:49

## 2019-10-15 RX ADMIN — GLYCOPYRROLATE 0.2 MG: 0.2 INJECTION, SOLUTION INTRAMUSCULAR; INTRAVENOUS at 09:49

## 2019-10-15 RX ADMIN — ROCURONIUM BROMIDE 10 MG: 10 INJECTION INTRAVENOUS at 11:18

## 2019-10-15 RX ADMIN — SODIUM CHLORIDE, POTASSIUM CHLORIDE, SODIUM LACTATE AND CALCIUM CHLORIDE: 600; 310; 30; 20 INJECTION, SOLUTION INTRAVENOUS at 11:45

## 2019-10-15 RX ADMIN — CEFAZOLIN SODIUM 2 G: 2 INJECTION, SOLUTION INTRAVENOUS at 09:55

## 2019-10-15 RX ADMIN — ONDANSETRON HYDROCHLORIDE 4 MG: 2 INJECTION, SOLUTION INTRAVENOUS at 11:44

## 2019-10-15 RX ADMIN — OXYCODONE HYDROCHLORIDE 5 MG: 5 TABLET ORAL at 13:24

## 2019-10-15 RX ADMIN — ROCURONIUM BROMIDE 10 MG: 10 INJECTION INTRAVENOUS at 11:30

## 2019-10-15 RX ADMIN — Medication 4 MG: at 11:57

## 2019-10-15 RX ADMIN — HYDROMORPHONE HYDROCHLORIDE 0.5 MG: 1 INJECTION, SOLUTION INTRAMUSCULAR; INTRAVENOUS; SUBCUTANEOUS at 13:38

## 2019-10-15 RX ADMIN — HYDROMORPHONE HYDROCHLORIDE 1 MG: 1 INJECTION, SOLUTION INTRAMUSCULAR; INTRAVENOUS; SUBCUTANEOUS at 10:03

## 2019-10-15 RX ADMIN — MIDAZOLAM 2 MG: 1 INJECTION INTRAMUSCULAR; INTRAVENOUS at 09:40

## 2019-10-15 RX ADMIN — ROCURONIUM BROMIDE 50 MG: 10 INJECTION INTRAVENOUS at 09:49

## 2019-10-15 RX ADMIN — PROPOFOL 150 MCG/KG/MIN: 10 INJECTION, EMULSION INTRAVENOUS at 09:54

## 2019-10-15 RX ADMIN — KETOROLAC TROMETHAMINE 30 MG: 30 INJECTION, SOLUTION INTRAMUSCULAR at 11:51

## 2019-10-15 RX ADMIN — HYDROMORPHONE HYDROCHLORIDE 0.5 MG: 1 INJECTION, SOLUTION INTRAMUSCULAR; INTRAVENOUS; SUBCUTANEOUS at 13:19

## 2019-10-15 RX ADMIN — PROPOFOL 200 MG: 10 INJECTION, EMULSION INTRAVENOUS at 09:49

## 2019-10-15 RX ADMIN — PROPOFOL 100 MG: 10 INJECTION, EMULSION INTRAVENOUS at 09:52

## 2019-10-15 RX ADMIN — DEXAMETHASONE SODIUM PHOSPHATE 4 MG: 4 INJECTION, SOLUTION INTRA-ARTICULAR; INTRALESIONAL; INTRAMUSCULAR; INTRAVENOUS; SOFT TISSUE at 09:49

## 2019-10-15 RX ADMIN — SODIUM CHLORIDE, POTASSIUM CHLORIDE, SODIUM LACTATE AND CALCIUM CHLORIDE: 600; 310; 30; 20 INJECTION, SOLUTION INTRAVENOUS at 09:38

## 2019-10-15 RX ADMIN — FENTANYL CITRATE 50 MCG: 50 INJECTION INTRAMUSCULAR; INTRAVENOUS at 12:28

## 2019-10-15 RX ADMIN — FENTANYL CITRATE 50 MCG: 50 INJECTION, SOLUTION INTRAMUSCULAR; INTRAVENOUS at 11:02

## 2019-10-15 RX ADMIN — FENTANYL CITRATE 50 MCG: 50 INJECTION INTRAMUSCULAR; INTRAVENOUS at 12:39

## 2019-10-15 RX ADMIN — FENTANYL CITRATE 50 MCG: 50 INJECTION, SOLUTION INTRAMUSCULAR; INTRAVENOUS at 10:53

## 2019-10-15 RX ADMIN — ROCURONIUM BROMIDE 10 MG: 10 INJECTION INTRAVENOUS at 10:45

## 2019-10-15 RX ADMIN — ROCURONIUM BROMIDE 10 MG: 10 INJECTION INTRAVENOUS at 10:59

## 2019-10-15 RX ADMIN — GLYCOPYRROLATE 0.6 MG: 0.2 INJECTION, SOLUTION INTRAMUSCULAR; INTRAVENOUS at 11:57

## 2019-10-15 ASSESSMENT — MIFFLIN-ST. JEOR: SCORE: 1822.76

## 2019-10-15 NOTE — ANESTHESIA PREPROCEDURE EVALUATION
Anesthesia Pre-Procedure Evaluation    Patient: Marilu Lozoya   MRN: [id number] : 2000          Preoperative Diagnosis: Calculus of gallbladder without cholecystitis without obstruction [K80.20]    Procedure(s):  CHOLECYSTECTOMY, LAPAROSCOPIC    Past Medical History:   Diagnosis Date     Uncomplicated asthma      History reviewed. No pertinent surgical history.  Anesthesia Evaluation     .             ROS/MED HX    ENT/Pulmonary:     (+)asthma , . .    Neurologic:  - neg neurologic ROS     Cardiovascular:  - neg cardiovascular ROS   (+) Dyslipidemia, ----. : . . . :. .       METS/Exercise Tolerance:     Hematologic:  - neg hematologic  ROS       Musculoskeletal:  - neg musculoskeletal ROS       GI/Hepatic:  - neg GI/hepatic ROS       Renal/Genitourinary:  - ROS Renal section negative       Endo: Comment: Pre-DM    .Body mass index is 44.54 kg/m .   - neg endo ROS   (+) Obesity, .      Psychiatric:     (+) psychiatric history anxiety      Infectious Disease:  - neg infectious disease ROS       Malignancy:         Other: Comment: .Lab Test        10/05/19     01/08/19                       0022          2130          WBC          11.0         17.8*         HGB          12.3         14.2          MCV          86           85            PLT          343          319            Lab Test        10/05/19     01/08/19                       0022          2130          NA           136          135           POTASSIUM    3.6          3.7           CHLORIDE     102          100           CO2          26           25            BUN          8            9             CR           0.59         0.61          ANIONGAP     8            10            DYLAN          9.5          8.5           GLC          135*         143*                                   Physical Exam  Normal systems: cardiovascular and pulmonary    Airway   Mallampati: III    Dental     Cardiovascular   Rhythm and rate: regular and normal      Pulmonary  "   breath sounds clear to auscultation            Lab Results   Component Value Date    WBC 11.0 10/05/2019    HGB 12.3 10/05/2019    HCT 39.0 10/05/2019     10/05/2019     10/05/2019    POTASSIUM 3.6 10/05/2019    CHLORIDE 102 10/05/2019    CO2 26 10/05/2019    BUN 8 10/05/2019    CR 0.59 10/05/2019     (H) 10/05/2019    DYLAN 9.5 10/05/2019    ALBUMIN 3.8 10/05/2019    PROTTOTAL 8.9 (H) 10/05/2019    ALT 67 (H) 10/05/2019    AST 34 10/05/2019    ALKPHOS 98 10/05/2019    BILITOTAL 0.3 10/05/2019    LIPASE 125 10/05/2019    HCG Negative 10/15/2019       Preop Vitals  BP Readings from Last 3 Encounters:   10/15/19 130/80   10/07/19 124/82   10/05/19 133/75    Pulse Readings from Last 3 Encounters:   10/07/19 85   10/05/19 69   08/28/19 88      Resp Readings from Last 3 Encounters:   10/15/19 16   10/07/19 16   10/05/19 18    SpO2 Readings from Last 3 Encounters:   10/15/19 97%   10/07/19 99%   10/05/19 100%      Temp Readings from Last 1 Encounters:   10/15/19 95.7  F (35.4  C) (Temporal)    Ht Readings from Last 1 Encounters:   10/15/19 1.575 m (5' 2\")      Wt Readings from Last 1 Encounters:   10/15/19 110.5 kg (243 lb 8 oz)    Estimated body mass index is 44.54 kg/m  as calculated from the following:    Height as of this encounter: 1.575 m (5' 2\").    Weight as of this encounter: 110.5 kg (243 lb 8 oz).       Anesthesia Plan      History & Physical Review  History and physical reviewed and following examination; no interval change.    ASA Status:  3 .        Plan for General and ETT with Intravenous induction. Maintenance will be Inhalation and Balanced.    PONV prophylaxis:  Dexamethasone or Solumedrol and Ondansetron (or other 5HT-3)       Postoperative Care  Postoperative pain management:  IV analgesics, Oral pain medications and Multi-modal analgesia.      Consents  Anesthetic plan, risks, benefits and alternatives discussed with:  Patient or representative..                 Humphrey Denney, "                     .

## 2019-10-15 NOTE — BRIEF OP NOTE
[facility]    Brief Operative Note    Pre-operative diagnosis: Calculus of gallbladder without cholecystitis without obstruction [K80.20]  Post-operative diagnosis chronic cholecystitis, hepatomegaly    Procedure: Procedure(s):  CHOLECYSTECTOMY, LAPAROSCOPIC  Surgeon: Surgeon(s) and Role:     * Michelle Martel MD - Primary     * Juana Covington PA-C - Assisting  Anesthesia: General   Estimated blood loss: 20ml  Drains: None  Specimens:   ID Type Source Tests Collected by Time Destination   A : Gallbladder Tissue Gallbladder and Contents SURGICAL PATHOLOGY EXAM Michelle Martel MD 10/15/2019 11:40 AM      Findings:   None. Fatty liver, very enlarged. Chronic cholecystitis  Complications: None.  Implants: * No implants in log *

## 2019-10-15 NOTE — ANESTHESIA POSTPROCEDURE EVALUATION
Patient: Marilu Lozoya    Procedure(s):  CHOLECYSTECTOMY, LAPAROSCOPIC    Diagnosis:Calculus of gallbladder without cholecystitis without obstruction [K80.20]  Diagnosis Additional Information: No value filed.    Anesthesia Type:  General, ETT    Note:  Anesthesia Post Evaluation    Patient location during evaluation: PACU  Patient participation: Able to fully participate in evaluation  Level of consciousness: awake  Pain management: adequate  Airway patency: patent  Cardiovascular status: acceptable  Respiratory status: acceptable  Hydration status: acceptable  PONV: controlled     Anesthetic complications: None          Last vitals:  Vitals:    10/15/19 1350 10/15/19 1355 10/15/19 1400   BP: 115/75     Pulse: 77     Resp: 13 22 15   Temp:      SpO2: 98% 98% 98%         Electronically Signed By: Humphrey Denney DO  October 15, 2019  2:18 PM

## 2019-10-15 NOTE — ANESTHESIA CARE TRANSFER NOTE
Patient: Mariul Lozoya    Procedure(s):  CHOLECYSTECTOMY, LAPAROSCOPIC    Diagnosis: Calculus of gallbladder without cholecystitis without obstruction [K80.20]  Diagnosis Additional Information: No value filed.    Anesthesia Type:   General, ETT     Note:  Airway :Face Mask  Patient transferred to:PACU  Handoff Report: Identifed the Patient, Identified the Reponsible Provider, Reviewed the pertinent medical history, Discussed the surgical course, Reviewed Intra-OP anesthesia mangement and issues during anesthesia, Set expectations for post-procedure period and Allowed opportunity for questions and acknowledgement of understanding      Vitals: (Last set prior to Anesthesia Care Transfer)    CRNA VITALS  10/15/2019 1144 - 10/15/2019 1221      10/15/2019             Resp Rate (observed):  (!) 3                Electronically Signed By: LICO Church CRNA  October 15, 2019  12:21 PM

## 2019-10-15 NOTE — DISCHARGE INSTRUCTIONS
HOME CARE FOLLOWING LAPAROSCOPIC CHOLECYSTECTOMY  ELIA Mauricio, JACOB Gonzalez R. O Donnell, J. Shaheen    INCISIONAL CARE:  Replace the bandage over your incisions until all drainage stops, or if more comfortable to have in place.  If present, leave the steri-strips (white paper tapes) in place for 14 days after surgery.  If Dermabond (a type of skin glue) is present, leave in place until it wears/flakes off.     BATHING:  Avoid baths for 1 week after surgery.  Showers are okay.  You may wash your hair at any time.  Gently pat your incisions dry after bathing.    ACTIVITY:  Light Activity -- you may immediately be up and about as tolerated.  Driving -- you may drive when comfortable and off narcotic pain medications.  Light Work -- resume when comfortable off pain medications.  (If you can drive, you probably can work.)  Strenuous Work/Activity -- limit lifting to 20 pounds for 2 weeks.  Progressively increase with time.  Active Sports (running, biking, etc.) -- cautiously resume after 2 weeks.    DISCOMFORT:  Use pain medications as prescribed by your surgeon.  Take the pain medication with some food, when possible, to minimize side effects.  Intermittent use of ice packs at the incision sites may help during the first 48 hours.  Expect gradual improvement.  You may experience shoulder pain, which is due to the air placed within your abdomen during the procedure.  This is temporary and usually passes within 2 days.  For the first 72 hours after surgery, take the following (can be obtained over the counter)  - Ibuprofen (motrin) 600mg every 6 hours (max 2,400mg per day)   - Tylenol (acetaminophen) 650mg every 6 hours (max 3,000mg per day)  - Take with food if GI upset occurs  - If additional pain medication is needed, take the narcotic that you were prescribed.  - After 72 hours, take the above pain medication only as needed.      DIET:  Drink plenty of fluids.  While taking pain  medications, increase dietary fiber or add a fiber supplementation like Metamucil or Citrucel to help prevent constipation - a possible side effect of pain medications.  It is not uncommon to experience some bowel changes (loose stools or constipation) after surgery.  Your body has to adapt to you no longer having a gall bladder.  To help minimize this side effect, avoid fatty foods for the first week after surgery.  You may then slowly increase the amount of fatty foods in your diet.      NAUSEA:  If nauseated from the anesthetic/pain meds; rest in bed, get up cautiously with assistance, and drink clear liquids (juice, tea, broth).    RETURN APPOINTMENT:  Schedule a follow-up visit 2-3 weeks post-op.  Office Phone:  [phone]     CONTACT US IF THE FOLLOWING DEVELOPS:   1. A fever that is above 101     2. If there is a large amount of drainage, bleeding, or swelling.   3. Severe pain that is not relieved by your prescription.   4. Drainage that is thick, cloudy, yellow, green or white.   5. Any other questions not answered by  Frequently Asked Questions  sheet.      FREQUENTLY ASKED QUESTIONS:    Q:  How should my incision look?    A:  Normally your incision will appear slightly swollen with light redness directly along the incision itself as it heals.  It may feel like a bump or ridge as the healing/scarring happens, and over time (3-4 months) this bump or ridge feeling should slowly go away.  In general, clear or pink watery drainage can be normal at first as your incision heals, but should decrease over time.    Q:  How do I know if my incision is infected?  A:  Look at your incision for signs of infection, like redness around the incision spreading to surrounding skin, or drainage of cloudy or foul-smelling drainage.  If you feel warm, check your temperature to see if you are running a fever.    **If any of these things occur, please notify the nurse at our office.  We may need you to come into the office for  an incision check.      Q:  How do I take care of my incision?  A:  If you have a dressing in place - Starting the day after surgery, replace the dressing 1-2 times a day until there is no further drainage from the incision.  At that time, a dressing is no longer needed.  Try to minimize tape on the skin if irritation is occurring at the tape sites.  If you have significant irritation from tape on the skin, please call the office to discuss other method of dressing your incision.    Small pieces of tape called  steri-strips  may be present directly overlying your incision; these may be removed 10 days after surgery unless otherwise specified by your surgeon.  If these tapes start to loosen at the ends, you may trim them back until they fall off or are removed.    A:  If you had  Dermabond  tissue glue used as a dressing (this causes your incision to look shiny with a clear covering over it) - This type of dressing wears off with time and does not require more dressings over the top unless it is draining around the glue as it wears off.  Do not apply ointments or lotions over the incisions until the glue has completely worn off.    Q:  There is a piece of tape or a sticky  lead  still on my skin.  Can I remove this?  A:  Sometimes the sticky  leads  used for monitoring during surgery or for evaluation in the emergency department are not all removed while you are in the hospital.  These sometimes have a tab or metal dot on them.  You can easily remove these on your own, like taking off a band-aid.  If there is a gel substance under the  lead , simply wipe/clean it off with a washcloth or paper towel.      Q:  What can I do to minimize constipation (very hard stools, or lack of stools)?  A:  Stay well hydrated.  Increase your dietary fiber intake or take a fiber supplement -with plenty of water.  Walk around frequently.  You may consider an over-the-counter stool-softener.  Your Pharmacist can assist you with choosing  one that is stocked at your pharmacy.  Constipation is also one of the most common side effects of pain medication.  If you are using pain medication, be pro-active and try to PREVENT problems with constipation by taking the steps above BEFORE constipation becomes a problem.    Q:  What do I do if I need more pain medications?  A:  Call the office to receive refills.  Be aware that certain pain meds cannot be called into a pharmacy and actually require a paper prescription.  A change may be made in your pain med as you progress thru your recovery period or if you have side effects to certain meds.    --Pain meds are NOT refilled after 5pm on weekdays, and NOT AT ALL on the weekends, so please look ahead to prevent problems.      Q:  Why am I having a hard time sleeping now that I am at home?  A:  Many medications you receive while you are in the hospital can impact your sleep for a number of days after your surgery/hospitalization.  Decreased level of activity and naps during the day may also make sleeping at night difficult.  Try to minimize day-time naps, and get up frequently during the day to walk around your home during your recovery time.  Sleep aides may be of some help, but are not recommended for long-term use.      Q:  I am having some back discomfort.  What should I do?  A:  This may be related to certain positioning that was required for your surgery, extended periods of time in bed, or other changes in your overall activity level.  You may try ice, heat, acetaminophen, or ibuprofen to treat this temporarily.  Note that many pain medications have acetaminophen in them and would state this on the prescription bottle.  Be sure not to exceed the maximum of 4000mg per day of acetaminophen.     **If the pain you are having does not resolve, is severe, or is a flare of back pain you have had on other occasions prior to surgery, please contact your primary physician for further recommendations or for an  appointment to be examined at their office.    Q:  Why am I having headaches?  A:  Headaches can be caused by many things:  caffeine withdrawal, use of pain meds, dehydration, high blood pressure, lack of sleep, over-activity/exhaustion, flare-up of usual migraine headaches.  If you feel this is related to muscle tension (a band-like feeling around the head, or a pressure at the low-back of the head) you may try ice or heat to this area.  You may need to drink more fluids (try electrolyte drink like Gatorade), rest, or take your usual migraine medications.   **If your headaches do not resolve, worsen, are accompanied by other symptoms, or if your blood pressure is high, please call your primary physician for recommendation and/or examination.    Q:  I am unable to urinate.  What do I do?  A:  A small percentage of people can have difficulty urinating initially after surgery.  This includes being able to urinate only a very small amount at a time and feeling discomfort or pressure in the very low abdomen.  This is called  urinary retention , and is actually an urgent situation.  Proceed to your nearest Emergency department for evaluation (not an Urgent Care Center).  Sometimes the bladder does not work correctly after certain medications you receive during surgery, or related to certain procedures.  You may need to have a catheter placed until your bladder recovers.  When planning to go to an Emergency department, it may help to call the ER to let them know you are coming in for this problem after a surgery.  This may help you get in quicker to be evaluated.  **If you have symptoms of a urinary tract infection, please contact your primary physician for the proper evaluation and treatment.          If you have other questions, please call the office Monday thru Friday between 8am and 5pm to discuss with the nurse or physician assistant.  #[phone]    There is a surgeon ON CALL on weekday evenings and over the  weekend in case of urgent need only, and may be contacted at the same number.    If you are having an emergency, call 911 or proceed to your nearest emergency department      GENERAL ANESTHESIA OR SEDATION ADULT DISCHARGE INSTRUCTIONS   SPECIAL PRECAUTIONS FOR 24 HOURS AFTER SURGERY    IT IS NOT UNUSUAL TO FEEL LIGHT-HEADED OR FAINT, UP TO 24 HOURS AFTER SURGERY OR WHILE TAKING PAIN MEDICATION.  IF YOU HAVE THESE SYMPTOMS; SIT FOR A FEW MINUTES BEFORE STANDING AND HAVE SOMEONE ASSIST YOU WHEN YOU GET UP TO WALK OR USE THE BATHROOM.    YOU SHOULD REST AND RELAX FOR THE NEXT 24 HOURS AND YOU MUST MAKE ARRANGEMENTS TO HAVE SOMEONE STAY WITH YOU FOR AT LEAST 24 HOURS AFTER YOUR DISCHARGE.  AVOID HAZARDOUS AND STRENUOUS ACTIVITIES.  DO NOT MAKE IMPORTANT DECISIONS FOR 24 HOURS.    DO NOT DRIVE ANY VEHICLE OR OPERATE MECHANICAL EQUIPMENT FOR 24 HOURS FOLLOWING THE END OF YOUR SURGERY.  EVEN THOUGH YOU MAY FEEL NORMAL, YOUR REACTIONS MAY BE AFFECTED BY THE MEDICATION YOU HAVE RECEIVED.    DO NOT DRINK ALCOHOLIC BEVERAGES FOR 24 HOURS FOLLOWING YOUR SURGERY.    DRINK CLEAR LIQUIDS (APPLE JUICE, GINGER ALE, 7-UP, BROTH, ETC.).  PROGRESS TO YOUR REGULAR DIET AS YOU FEEL ABLE.    YOU MAY HAVE A DRY MOUTH, A SORE THROAT, MUSCLES ACHES OR TROUBLE SLEEPING.  THESE SHOULD GO AWAY AFTER 24 HOURS.    CALL YOUR DOCTOR FOR ANY OF THE FOLLOWING:  SIGNS OF INFECTION (FEVER, GROWING TENDERNESS AT THE SURGERY SITE, A LARGE AMOUNT OF DRAINAGE OR BLEEDING, SEVERE PAIN, FOUL-SMELLING DRAINAGE, REDNESS OR SWELLING.    IT HAS BEEN OVER 8 TO 10 HOURS SINCE SURGERY AND YOU ARE STILL NOT ABLE TO URINATE (PASS WATER).       You received Toradol, an IV form of ibuprofen (Motrin) at 11:51 am.  Do not take any ibuprofen products until 5:51 pm.

## 2019-10-15 NOTE — OP NOTE
[facility] General Surgery Operative Note    Pre-operative diagnosis: symptomatic gallstones   Post-operative diagnosis: same   Procedure: laparoscopic cholecystectomy   Surgeon: Michelle Martel MD   Assistant(s): Juana Covington PA-C  The Physician Assistant was medically necessary for their expertise in prepping, camera management, suctioning, suturing and retraction.   Anesthesia: general   Estimated blood loss:  Specimen: 25 cc  gallbladder and contents               INDICATION FOR OPERATION: This is a 19 year old female who presented to clinic after being seen in the emergency room with abdominal pain. Studies including ultrasound were consistent with cholelithiasis. We discussed laparoscopic cholecystectomy and the patient agreed to proceed after hearing the risks and benefits.    DESCRIPTION OF PROCEDURE:  The patient was taken to the operating room and placed on the table in supine position.  General endotracheal anesthesia was induced and the abdomen was prepped and draped in standard sterile fashion.  An incision above the umbilicus was made with a blade.  The incision was carried down to the fascia. The fascia was elevated with kocher clamps and the fascia was incised in the midline with a blade.  The peritoneum was entered sharply. 0 Vicryl suture was placed in the fascia.  The Tamika trocar was introduced and the abdomen was insufflated with CO2.  A 5 mm trocar was placed in the subxiphoid position.  A 5 mm trocar was placed in the right upper quadrant, just below the costal margin at the midclavicular line.  Another was placed at the anterior axillary line just below the costal margin on the right.  The patient was placed in reverse Trendelenburg and right side up.  The gallbladder appeared moderately thickened and tensely distended. A large aspiration needle was used to aspirate dark bilious fluid from the gallbladder. The fundus of the gallbladder was grasped and retracted cephalad.    Due to the very large size of the liver a 5th 5mm port was placed in the epigastric area and a liver retractor placed.   The infundibulum was grasped and retracted laterally.  The peritoneum over the medial and lateral aspects of the triangle of Calot was taken down with the [place] dissector and modest amounts of Bovie electrocautery.  The cystic duct and artery were freed up from surrounding tissues.  The triangle of Calot was skeletonized revealing the critical view of safety.    There was a very large node of Calot anterior to the cystic artery which dissected down and left in situ. The cystic artery was anterior and lateral to the cystic duct.    The cystic artery and duct were each clipped twice proximally, once distally and transected with the scissors.  The gallbladder was then removed from the liver using the hook electrocautery.  The gallbladder was passed into an Endocatch bag and removed through the umbilical trocar site. The fascia was enlarged in order to remove the gallbladder which contained large stones. We observed the right upper quadrant carefully for hemostasis.  Hemostasis was assured.  The abdomen was irrigated with saline. The trocar sites were anesthetized with local anesthetic.  Each of the trocars was removed under direct visualization.  There was no bleeding from any of these sites.  The Tamika trocar was removed and the abdomen was evacuated of CO2. Additional 0 vicryl sutures were placed in the fascia and the previously placed 0 vicryl suture in the fascia was tied down to good effect.  The skin of the umbilical incision was anesthetized with local anesthetic.  All of the incisions were closed with interrupted 4-0 Vicryl subcuticular sutures and Steri-Strips.  The patient tolerated the procedure well.  Sponge and instrument counts were correct.      FINDINGS: Very large fatty liver and morbid obesity making dissection unusually difficult, requiring placement of 5th port for liver  retractor placement. Chronic cholecystitis.    Michelle Martel MD

## 2019-10-16 LAB — COPATH REPORT: NORMAL

## 2019-10-22 ENCOUNTER — OFFICE VISIT (OUTPATIENT)
Dept: SURGERY | Facility: CLINIC | Age: 19
End: 2019-10-22
Payer: COMMERCIAL

## 2019-10-22 VITALS
WEIGHT: 243 LBS | SYSTOLIC BLOOD PRESSURE: 112 MMHG | HEIGHT: 62 IN | RESPIRATION RATE: 16 BRPM | BODY MASS INDEX: 44.72 KG/M2 | OXYGEN SATURATION: 98 % | HEART RATE: 85 BPM | DIASTOLIC BLOOD PRESSURE: 78 MMHG

## 2019-10-22 DIAGNOSIS — Z09 SURGICAL FOLLOWUP VISIT: Primary | ICD-10-CM

## 2019-10-22 PROCEDURE — 99024 POSTOP FOLLOW-UP VISIT: CPT | Performed by: PHYSICIAN ASSISTANT

## 2019-10-22 ASSESSMENT — MIFFLIN-ST. JEOR: SCORE: 1820.49

## 2019-10-22 NOTE — PROGRESS NOTES
[facility] Note   Subjective:  Marilu Lozoya is here for her first postoperative visit.  She underwent Laparoscopic Cholecystectomy by Dr. Martel on October/15.  Pathology revealed: acute on chronic necrotizing cholecystitis with stones.  Intra-operatively a very large fatty liver was noted and additional trocar was needed for liver retraction.      She is now 1 week postop.  Today she is feeling well, having normal bowel movements and denies incision concerns.  She admits to occasionally eating poorly and quite a high fat content, which has caused her some nausea requiring use of zofran.    Objective:  Abd - soft, non-tender, non-distended  Incs - steri-strips in place, healing well, mild tenderness at supraumbilical site, no erythema/bruising, +normal healing ridges/density, no seroma/hematoma noted, no hernia noted    Assessment:  S/p Laparoscopic Cholecystectomy; unremarkable recovery.  Morbid obesity    Plan:  Marilu was encouraged to remain on a low fat diet for additional 1-2 weeks prior to trying to reintroduce fatty foods, and only to do so in small amounts at that time.  We also discussed her recent weight loss and diet changes, obviously encouraging continued efforts and possible f/u with PCP and/or dietician or health  to continue these efforts.  She is aware of her enlarged/fatty liver and that this can improve with weight loss.    She may continue to slowly advance her activities at this time.  General recommendation is to remain at a 30 lb weight restriction until 3 weeks after surgery.  After that time, she may increase activity as tolerated.  She may continue to utilize OTC pain management options as well as use of ice/heat to sites for comfort.  She should expect progressive resolution of the healing ridge along the incisional sites, normalizing bowel function, and improvement of food intolerances over the following 2-3 months.    Pt is recommended to contact the office  if worsening pain, onset of fever/redness at any inc site, or new drainage from the area.  Pt also recommended to call office at any time if ongoing questions/concerns during recovery, but otherwise may follow-up on a prn basis.  Marilu is in agreement with this plan.    Tennille Mendoza PA-C      Please route or send letter to:  PCP, Millicent Hughes at [facility]

## 2020-07-01 ENCOUNTER — HOSPITAL ENCOUNTER (EMERGENCY)
Facility: CLINIC | Age: 20
Discharge: HOME | End: 2020-07-01
Attending: EMERGENCY MEDICINE | Admitting: EMERGENCY MEDICINE
Payer: COMMERCIAL

## 2020-07-01 ENCOUNTER — APPOINTMENT (OUTPATIENT)
Dept: GENERAL RADIOLOGY | Facility: CLINIC | Age: 20
End: 2020-07-01
Attending: EMERGENCY MEDICINE
Payer: COMMERCIAL

## 2020-07-01 VITALS
TEMPERATURE: 98 F | WEIGHT: 256.39 LBS | DIASTOLIC BLOOD PRESSURE: 94 MMHG | RESPIRATION RATE: 22 BRPM | BODY MASS INDEX: 46.9 KG/M2 | OXYGEN SATURATION: 100 % | SYSTOLIC BLOOD PRESSURE: 127 MMHG

## 2020-07-01 DIAGNOSIS — T14.8XXA PUNCTURE WOUND: ICD-10-CM

## 2020-07-01 PROCEDURE — 25000132 ZZH RX MED GY IP 250 OP 250 PS 637: Performed by: EMERGENCY MEDICINE

## 2020-07-01 PROCEDURE — 90471 IMMUNIZATION ADMIN: CPT

## 2020-07-01 PROCEDURE — 25000128 H RX IP 250 OP 636: Performed by: EMERGENCY MEDICINE

## 2020-07-01 PROCEDURE — 90714 TD VACC NO PRESV 7 YRS+ IM: CPT | Performed by: EMERGENCY MEDICINE

## 2020-07-01 PROCEDURE — 73630 X-RAY EXAM OF FOOT: CPT | Mod: RT

## 2020-07-01 PROCEDURE — 99283 EMERGENCY DEPT VISIT LOW MDM: CPT | Mod: 25

## 2020-07-01 RX ORDER — HYDROCODONE BITARTRATE AND ACETAMINOPHEN 5; 325 MG/1; MG/1
1 TABLET ORAL EVERY 6 HOURS PRN
Qty: 10 TABLET | Refills: 0 | Status: SHIPPED | OUTPATIENT
Start: 2020-07-01 | End: 2020-11-06

## 2020-07-01 RX ORDER — IBUPROFEN 600 MG/1
600 TABLET, FILM COATED ORAL EVERY 6 HOURS PRN
Qty: 20 TABLET | Refills: 0 | Status: SHIPPED | OUTPATIENT
Start: 2020-07-01 | End: 2020-07-31

## 2020-07-01 RX ORDER — CEPHALEXIN 500 MG/1
500 CAPSULE ORAL 2 TIMES DAILY
Qty: 10 CAPSULE | Refills: 0 | Status: SHIPPED | OUTPATIENT
Start: 2020-07-01 | End: 2020-07-06

## 2020-07-01 RX ORDER — CIPROFLOXACIN 500 MG/1
500 TABLET, FILM COATED ORAL 2 TIMES DAILY
Qty: 10 TABLET | Refills: 0 | Status: SHIPPED | OUTPATIENT
Start: 2020-07-01 | End: 2020-07-01

## 2020-07-01 RX ORDER — IBUPROFEN 600 MG/1
600 TABLET, FILM COATED ORAL ONCE
Status: COMPLETED | OUTPATIENT
Start: 2020-07-01 | End: 2020-07-01

## 2020-07-01 RX ADMIN — CLOSTRIDIUM TETANI TOXOID ANTIGEN (FORMALDEHYDE INACTIVATED) AND CORYNEBACTERIUM DIPHTHERIAE TOXOID ANTIGEN (FORMALDEHYDE INACTIVATED) 0.5 ML: 5; 2 INJECTION, SUSPENSION INTRAMUSCULAR at 01:05

## 2020-07-01 RX ADMIN — IBUPROFEN 600 MG: 600 TABLET, FILM COATED ORAL at 01:04

## 2020-07-01 ASSESSMENT — ENCOUNTER SYMPTOMS
WEAKNESS: 0
NUMBNESS: 0
WOUND: 1

## 2020-07-01 NOTE — ED PROVIDER NOTES
History     Chief Complaint:    Puncture Wound      HPI   Marilu Lozoya is a 20 year old female who presents with a puncture wound. Patient states that about an hour ago, she stepped on a dirty nail in her father's store with her R. foot. She notes the nail was 3-4 inches long and she was wearing shoes.  No reported paresthesias, focal weakness or other symptoms. She also notes she is prediabetic, but she denies IV drug use.     Allergies:  The patient has no known drug allergies.    Medications:    Albuterol  Metformin    Past Medical History:    Asthma  Obesity  Anxiety  Reactive airway disease without complication  Hyperlipidemia  Prediabetes  ASC-US    Past Surgical History:    Cholecystectomy    Family History:    Mother: Depression    Social History:  Former smoker  Occasional alcohol use  Denies drug use  Marital Status:  Single [1]     Review of Systems   Skin: Positive for wound.   Neurological: Negative for weakness and numbness.   All other systems reviewed and are negative.    Physical Exam   First Vitals:  BP: (!) 127/94  Heart Rate: 72  Temp: 98  F (36.7  C)  Resp: 22  Weight: 116.3 kg (256 lb 6.3 oz)  SpO2: 100 %      Physical Exam  General: Resting comfortably   Head:  The scalp, face, and head appear normal  Eyes:  The pupils are normal    Conjunctivae and sclera appear normal  ENT:    The nose is normal  Neck:  Normal range of motion  MSK:  Moves all extremities equally  R Foot Exam:  Inspection: 0.5cm puncture wound to midfoot with mild soft tissue swelling, no purulent drainage, warmth.  Tender to touch  Strength: Normal painless right hip and knee flex/ext. No pain with right ankle ROM actively and passively  Sensation: Intact to light touch distally all toes  Cap refill all toes:   < 2 seconds distally.   PT/DP Pulses  Normal left foot/ankle  Skin:  No rash or lesions noted.  Neuro:  Speech is normal and fluent  Psych: Awake. Alert.  Normal affect.         Emergency Department Course      Imaging:  XR Foot, G/E, 3 views, right:   IMPRESSION: No radiopaque foreign body. No fracture. Normal alignment.  as per radiology.     Interventions:  0104 Ibuprofen 600 mg Oral  0105 Td - adult formulation 0.5 mL Intramuscular    Emergency Department Course:  Nursing notes and vitals reviewed. (0038) I performed an exam of the patient as documented above.     Medicine administered as documented above.     The patient was sent for a XR Foot G/E 3 vies right while in the emergency department, findings above.     0124 I rechecked the patient and discussed the results of her workup thus far.     Findings and plan explained to the Patient. Patient discharged home with instructions regarding supportive care, medications, and reasons to return. The importance of close follow-up was reviewed. The patient was prescribed keflex, Norco, and Ibuprofen.     Impression & Plan      Medical Decision Making:  Patient is a 20-year-old female presenting with a right puncture wound to her foot.  She is neurovascularly intact.  There is no evidence of overlying cellulitis.  X-ray without retained foreign body or focal fracture.  Patient's tetanus updated.  I did discuss cannot exclude tendon injury though no indication for further emergent work-up at this time.  The wound was cleaned and dressed.  Patient was placed in a postop shoe and given crutches with weightbearing progression as tolerated.  In setting of nail injury she will be covered with antibiotics.  She will be discharged home with analgesia as well.  Planning close outpatient follow-up.  She was also provided orthopedic follow-up as needed. I reviewed with the patient signs and symptoms of wound infection: worsening redness, swelling, pain, fever, streaking of the skin, and if this is a concern, to return for provider evaluation. Patient expressed understanding of instructions.       Diagnosis:    ICD-10-CM    1. Puncture wound  T14.8XXA        Disposition:  discharged  to home with keflex, Norco, and Ibuprofen.     Discharge Medications:  Discharge Medication List as of 7/1/2020  1:51 AM      START taking these medications    Details   cephALEXin (KEFLEX) 500 MG capsule Take 1 capsule (500 mg) by mouth 2 times daily for 5 days, Disp-10 capsule,R-0, Local Print      HYDROcodone-acetaminophen (NORCO) 5-325 MG tablet Take 1 tablet by mouth every 6 hours as needed for pain, Disp-10 tablet,R-0, Local Print      !! ibuprofen (ADVIL/MOTRIN) 600 MG tablet Take 1 tablet (600 mg) by mouth every 6 hours as needed, Disp-20 tablet,R-0, Local Print       !! - Potential duplicate medications found. Please discuss with provider.        Scribe Disclosure:  I, Davin Zayas, am serving as a scribe on 7/1/2020 at 12:38 AM to personally document services performed by Cherry Dunbar DO based on my observations and the provider's statements to me.       Davin Zayas  7/1/2020   [facility] EMERGENCY DEPARTMENT       Cherry Dunbar DO  07/01/20 0252

## 2020-07-01 NOTE — ED AVS SNAPSHOT
[facility] Emergency Department  [address]  [facility] [address]  Phone:  [phone]  Fax:  [phone]                                    Marilu Lozoya   MRN: [id number]    Department:  [facility] Emergency Department   Date of Visit:  7/1/2020           After Visit Summary Signature Page    I have received my discharge instructions, and my questions have been answered. I have discussed any challenges I see with this plan with the nurse or doctor.    ..........................................................................................................................................  Patient/Patient Representative Signature      ..........................................................................................................................................  Patient Representative Print Name and Relationship to Patient    ..................................................               ................................................  Date                                   Time    ..........................................................................................................................................  Reviewed by Signature/Title    ...................................................              ..............................................  Date                                               Time          22EPIC Rev 08/18

## 2020-11-06 ENCOUNTER — OFFICE VISIT (OUTPATIENT)
Dept: URGENT CARE | Facility: URGENT CARE | Age: 20
End: 2020-11-06
Payer: COMMERCIAL

## 2020-11-06 VITALS
OXYGEN SATURATION: 98 % | TEMPERATURE: 98 F | SYSTOLIC BLOOD PRESSURE: 124 MMHG | HEART RATE: 89 BPM | DIASTOLIC BLOOD PRESSURE: 72 MMHG

## 2020-11-06 DIAGNOSIS — B96.89 BV (BACTERIAL VAGINOSIS): Primary | ICD-10-CM

## 2020-11-06 DIAGNOSIS — N64.4 BREAST PAIN: ICD-10-CM

## 2020-11-06 DIAGNOSIS — N76.0 BV (BACTERIAL VAGINOSIS): Primary | ICD-10-CM

## 2020-11-06 LAB
ALBUMIN UR-MCNC: NEGATIVE MG/DL
APPEARANCE UR: CLEAR
BACTERIA #/AREA URNS HPF: ABNORMAL /HPF
BILIRUB UR QL STRIP: NEGATIVE
COLOR UR AUTO: YELLOW
GLUCOSE UR STRIP-MCNC: NEGATIVE MG/DL
HCG UR QL: NEGATIVE
HGB UR QL STRIP: ABNORMAL
KETONES UR STRIP-MCNC: NEGATIVE MG/DL
LEUKOCYTE ESTERASE UR QL STRIP: ABNORMAL
NITRATE UR QL: NEGATIVE
NON-SQ EPI CELLS #/AREA URNS LPF: ABNORMAL /LPF
PH UR STRIP: 6 PH (ref 5–7)
RBC #/AREA URNS AUTO: ABNORMAL /HPF
SOURCE: ABNORMAL
SP GR UR STRIP: 1.02 (ref 1–1.03)
SPECIMEN SOURCE: ABNORMAL
UROBILINOGEN UR STRIP-ACNC: 0.2 EU/DL (ref 0.2–1)
WBC #/AREA URNS AUTO: ABNORMAL /HPF
WET PREP SPEC: ABNORMAL

## 2020-11-06 PROCEDURE — 99213 OFFICE O/P EST LOW 20 MIN: CPT | Performed by: PHYSICIAN ASSISTANT

## 2020-11-06 PROCEDURE — 81001 URINALYSIS AUTO W/SCOPE: CPT | Performed by: PHYSICIAN ASSISTANT

## 2020-11-06 PROCEDURE — 87210 SMEAR WET MOUNT SALINE/INK: CPT | Performed by: PHYSICIAN ASSISTANT

## 2020-11-06 PROCEDURE — 81025 URINE PREGNANCY TEST: CPT | Performed by: PHYSICIAN ASSISTANT

## 2020-11-06 RX ORDER — METRONIDAZOLE 500 MG/1
500 TABLET ORAL 2 TIMES DAILY
Qty: 14 TABLET | Refills: 0 | Status: SHIPPED | OUTPATIENT
Start: 2020-11-06 | End: 2020-11-13

## 2020-11-06 NOTE — PROGRESS NOTES
SUBJECTIVE:  Marilu Lozoya is a 20 year old female who presents with vaginal discharge.    Onset of symptoms 1 week(s) ago, stable since.     Pain:none.     Vaginal bleeding: No      Vaginal symptoms: discharge and local irritation  No LMP recorded. (Menstrual status: Irregular Periods).   Patient has not had her period in about one year.     Sexually active: no  Predisposing factors: None  Hx of previous symptom: rare    Patient also complaining of intermittent breast pain. Worse after moving heavy boxes. Fam HX of Colon CA. No nipple discharge or breast rash.     Past Medical History:   Diagnosis Date     Uncomplicated asthma      Current Outpatient Medications   Medication Sig Dispense Refill     albuterol (PROAIR HFA/PROVENTIL HFA/VENTOLIN HFA) 108 (90 Base) MCG/ACT inhaler Inhale 1-2 puffs into the lungs every 4 hours as needed        metFORMIN (GLUCOPHAGE-XR) 500 MG 24 hr tablet Take 500 mg by mouth daily (with dinner)        Social History     Socioeconomic History     Marital status: Single     Spouse name: Not on file     Number of children: Not on file     Years of education: Not on file     Highest education level: Not on file   Occupational History     Not on file   Social Needs     Financial resource strain: Not on file     Food insecurity     Worry: Not on file     Inability: Not on file     Transportation needs     Medical: Not on file     Non-medical: Not on file   Tobacco Use     Smoking status: Former Smoker     Packs/day: 0.00     Quit date: 10/11/2018     Years since quittin.0     Smokeless tobacco: Never Used   Substance and Sexual Activity     Alcohol use: Yes     Comment: occas     Drug use: No     Sexual activity: Not on file   Lifestyle     Physical activity     Days per week: Not on file     Minutes per session: Not on file     Stress: Not on file   Relationships     Social connections     Talks on phone: Not on file     Gets together: Not on file     Attends Confucianist service: Not  on file     Active member of club or organization: Not on file     Attends meetings of clubs or organizations: Not on file     Relationship status: Not on file     Intimate partner violence     Fear of current or ex partner: Not on file     Emotionally abused: Not on file     Physically abused: Not on file     Forced sexual activity: Not on file   Other Topics Concern     Not on file   Social History Narrative     Not on file       ROS:   Review of systems negative except as stated above.    OBJECTIVE:  /72   Pulse 89   Temp 98  F (36.7  C) (Tympanic)   SpO2 98%   deferred  GENERAL APPEARANCE: healthy, alert and no distress  CV: regular rates and rhythm, normal S1 S2, no murmur noted  ABDOMEN:  soft, nontender, no HSM or masses and bowel sounds normal  BACK: No CVA tenderness  SKIN: no suspicious lesions or rashes  Breast exam: No abnormality    ASSESSMENT / PLAN:  1. BV (bacterial vaginosis)  Med as prescribed  She has not had menses for one year. Pg neg. Would like her to follow-up with PCP / OB for further eval into this.   - Wet prep  - *UA reflex to Microscopic and Culture ([facility] (except [place] and [place])  - Urine Microscopic  - HCG Qual, Urine ([id number])  - metroNIDAZOLE (FLAGYL) 500 MG tablet; Take 1 tablet (500 mg) by mouth 2 times daily for 7 days  Dispense: 14 tablet; Refill: 0    2. Breast pain (intermittent)  No abnormality noted on exam.   Worse with certain movements.   Monitor     Diagnosis and treatment plan was reviewed with patient and/or family.   We went over any labs or imaging. Discussed worsening symptoms or little to no relief despite treatment plan to follow-up with PCP or return to clinic.  Patient verbalizes understanding. All questions were addressed and answered.   Chantell Ordoñez PA-C

## 2020-11-06 NOTE — LETTER
[facility]  [address]  [address]  [facility] [address]  Phone: [phone]  Fax: [phone]    November 6, 2020        Marilu Lozoya  [address] [facility] [address]          To whom it may concern:    RE: Marilu Lozoya    Patient was seen and treated today at our clinic. Please excuse patient from work 11/5/2020.     Please contact me for questions or concerns.      Sincerely,        Chantell Ordoñez PA-C

## 2021-01-27 ENCOUNTER — OFFICE VISIT (OUTPATIENT)
Dept: URGENT CARE | Facility: URGENT CARE | Age: 21
End: 2021-01-27
Payer: COMMERCIAL

## 2021-01-27 VITALS
BODY MASS INDEX: 45.08 KG/M2 | SYSTOLIC BLOOD PRESSURE: 104 MMHG | WEIGHT: 245 LBS | DIASTOLIC BLOOD PRESSURE: 70 MMHG | OXYGEN SATURATION: 98 % | HEART RATE: 83 BPM | RESPIRATION RATE: 20 BRPM | HEIGHT: 62 IN | TEMPERATURE: 98.2 F

## 2021-01-27 DIAGNOSIS — R20.2 PARESTHESIA: Primary | ICD-10-CM

## 2021-01-27 PROCEDURE — 99213 OFFICE O/P EST LOW 20 MIN: CPT | Performed by: PHYSICIAN ASSISTANT

## 2021-01-27 ASSESSMENT — MIFFLIN-ST. JEOR: SCORE: 1824.56

## 2021-01-27 NOTE — PROGRESS NOTES
"SUBJECTIVE:   Marilu Lozoya is a 20 year old female presenting with a chief complaint of   Chief Complaint   Patient presents with     Urgent Care     Consult     bilateral hand pain, with numbness and tingling, right is worse. Possible carpal tunnel        She is an established patient of [facility].  Patient presents with 2 weeks of bilateral hand pain and numbness to bilateral elbow on down.  Position does not matter.  Only occurs with sleeping.  Patient states that tip of middle finger is still numb.  Last A1C was 6.3 2020.  RHD. No trauma.   Nothing makes worse or better. She denies sleeping with arms bent.      Review of Systems   Neurological:        Parathesias    All other systems reviewed and are negative.      Past Medical History:   Diagnosis Date     Uncomplicated asthma      No family history on file.  Current Outpatient Medications   Medication Sig Dispense Refill     albuterol (PROAIR HFA/PROVENTIL HFA/VENTOLIN HFA) 108 (90 Base) MCG/ACT inhaler Inhale 1-2 puffs into the lungs every 4 hours as needed        metFORMIN (GLUCOPHAGE-XR) 500 MG 24 hr tablet Take 500 mg by mouth daily (with dinner)        Social History     Tobacco Use     Smoking status: Former Smoker     Packs/day: 0.00     Quit date: 10/11/2018     Years since quittin.2     Smokeless tobacco: Never Used   Substance Use Topics     Alcohol use: Yes     Comment: occas       OBJECTIVE  /70 (BP Location: Right arm, Patient Position: Sitting)   Pulse 83   Temp 98.2  F (36.8  C) (Tympanic)   Resp 20   Ht 1.575 m (5' 2\")   Wt 111.1 kg (245 lb)   SpO2 98%   BMI 44.81 kg/m      Physical Exam  Vitals signs and nursing note reviewed.   Constitutional:       Appearance: Normal appearance. She is obese.   Eyes:      Extraocular Movements: Extraocular movements intact.      Conjunctiva/sclera: Conjunctivae normal.   Cardiovascular:      Rate and Rhythm: Normal rate.   Musculoskeletal: Normal range of motion.      Comments: " "Negative tinels negative phalens.  Full ROM, skin normal, good cap refill    Skin:     General: Skin is warm and dry.      Capillary Refill: Capillary refill takes less than 2 seconds.   Neurological:      General: No focal deficit present.      Mental Status: She is alert.   Psychiatric:         Mood and Affect: Mood normal.         Labs:  No results found for this or any previous visit (from the past 24 hour(s)).    X-Ray was not done.    ASSESSMENT:      ICD-10-CM    1. Paresthesia  R20.2 NEUROLOGY ADULT REFERRAL        Medical Decision Making:    Differential Diagnosis:  Neurologic disorder.    Serious Comorbid Conditions:  Adult:  asthma, prediabetic    PLAN:    Neurology referral.  Patient education.      Followup:    If not improving or if condition worsens, follow up with your Primary Care Provider, If not improving or if conditions worsens over the next 12-24 hours, go to the Emergency Department    Patient Instructions     Patient Education     Paraesthesias  Paraesthesia is a burning or prickling sensation that is sometimes felt in the hands, arms, legs or feet. It can also occur in other parts of the body. It can also feel like tingling or numbness, skin crawling, or itching. The feeling is not comfortable, but it is not painful. (The \"pins and needles\" feeling that happens when a foot or hand \"falls asleep\" is a temporary paraesthesia.)  Paraesthesias that last or come and go may be caused by medical issues that need to be treated. These include stroke, a bulging disk pressing on a nerve, a trapped nerve, vitamin deficiencies, uncontrolled diabetes, alcohol abuse, or even certain medicines.  Tests are often done. These tests may include blood tests, X-ray, CT (computerized tomography) scan, nerve conduction studies (NCS), or a muscle test (electromyography). Depending on the cause, treatment may include physical therapy.  Home care    Tell your healthcare provider about all medicines you take. This " includes prescription and over-the-counter medicines, vitamins, and herbs. Ask if any of the medicines may be causing your problems. Don't make any changes to prescription medicines without talking to your healthcare provider first.    You may be prescribed medicines to help relieve the tingling feeling or for pain. Take all medicines as directed.    A numb hand or foot may be more prone to injury. To help protect it:  ? Always use oven mitts.  ? Test water with an unaffected hand or foot.  ? Use caution when trimming nails. File sharp areas.  ? Wear shoes that fit well to avoid pressure points, blisters, and ulcers.  ? Inspect your hands and feet carefully (including the soles of your feet and between your toes) daily. If you see red areas, sores, or other problems, tell your healthcare provider.  Follow-up care  Follow up with your doctor, or as advised. You may need further testing or evaluation.     When to seek medical advice  Call your healthcare provider right away if any of the following occur:    Numbness or weakness of the face, one arm, or one leg    Slurred speech, confusion, trouble speaking, walking, or seeing    Severe headache, fainting spell, dizziness, or seizure    Chest, arm, neck, or upper back pain    Loss of bladder or bowel control    Open wound with redness, swelling, or pus     Lydia last reviewed this educational content on 4/1/2018 2000-2020 [employer]. [address] [place] [address]. All rights reserved. This information is not intended as a substitute for professional medical care. Always follow your healthcare professional's instructions.

## 2021-01-27 NOTE — LETTER
January 27, 2021      Marilu Lozoya  [address] [facility] [address]        To Whom It May Concern:    Marilu Lozoya was seen in our clinic. She may return to work without restrictions on 1/28/2021.      Sincerely,        Clarisa Mckeon PA-C

## 2021-01-27 NOTE — PATIENT INSTRUCTIONS
"  Patient Education     Paraesthesias  Paraesthesia is a burning or prickling sensation that is sometimes felt in the hands, arms, legs or feet. It can also occur in other parts of the body. It can also feel like tingling or numbness, skin crawling, or itching. The feeling is not comfortable, but it is not painful. (The \"pins and needles\" feeling that happens when a foot or hand \"falls asleep\" is a temporary paraesthesia.)  Paraesthesias that last or come and go may be caused by medical issues that need to be treated. These include stroke, a bulging disk pressing on a nerve, a trapped nerve, vitamin deficiencies, uncontrolled diabetes, alcohol abuse, or even certain medicines.  Tests are often done. These tests may include blood tests, X-ray, CT (computerized tomography) scan, nerve conduction studies (NCS), or a muscle test (electromyography). Depending on the cause, treatment may include physical therapy.  Home care    Tell your healthcare provider about all medicines you take. This includes prescription and over-the-counter medicines, vitamins, and herbs. Ask if any of the medicines may be causing your problems. Don't make any changes to prescription medicines without talking to your healthcare provider first.    You may be prescribed medicines to help relieve the tingling feeling or for pain. Take all medicines as directed.    A numb hand or foot may be more prone to injury. To help protect it:  ? Always use oven mitts.  ? Test water with an unaffected hand or foot.  ? Use caution when trimming nails. File sharp areas.  ? Wear shoes that fit well to avoid pressure points, blisters, and ulcers.  ? Inspect your hands and feet carefully (including the soles of your feet and between your toes) daily. If you see red areas, sores, or other problems, tell your healthcare provider.  Follow-up care  Follow up with your doctor, or as advised. You may need further testing or evaluation.     When to seek medical " advice  Call your healthcare provider right away if any of the following occur:    Numbness or weakness of the face, one arm, or one leg    Slurred speech, confusion, trouble speaking, walking, or seeing    Severe headache, fainting spell, dizziness, or seizure    Chest, arm, neck, or upper back pain    Loss of bladder or bowel control    Open wound with redness, swelling, or pus     [employer] last reviewed this educational content on 4/1/2018 2000-2020 [employer]. [address] [place] [address]. All rights reserved. This information is not intended as a substitute for professional medical care. Always follow your healthcare professional's instructions.

## 2021-03-10 ENCOUNTER — HOSPITAL ENCOUNTER (EMERGENCY)
Facility: CLINIC | Age: 21
Discharge: HOME | End: 2021-03-11
Attending: EMERGENCY MEDICINE | Admitting: EMERGENCY MEDICINE
Payer: COMMERCIAL
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
## 2021-03-10 DIAGNOSIS — S60.221A CONTUSION OF RIGHT HAND, INITIAL ENCOUNTER: ICD-10-CM

## 2021-03-10 DIAGNOSIS — S20.219A CONTUSION OF CHEST WALL, UNSPECIFIED LATERALITY, INITIAL ENCOUNTER: ICD-10-CM

## 2021-03-10 DIAGNOSIS — R10.30 LOWER ABDOMINAL PAIN: ICD-10-CM

## 2021-03-10 PROCEDURE — 250N000013 HC RX MED GY IP 250 OP 250 PS 637: Performed by: EMERGENCY MEDICINE

## 2021-03-10 PROCEDURE — 96360 HYDRATION IV INFUSION INIT: CPT | Mod: 59

## 2021-03-10 PROCEDURE — 99285 EMERGENCY DEPT VISIT HI MDM: CPT | Mod: 25

## 2021-03-10 RX ORDER — ACETAMINOPHEN 500 MG
1000 TABLET ORAL ONCE
Status: COMPLETED | OUTPATIENT
Start: 2021-03-10 | End: 2021-03-10

## 2021-03-10 RX ADMIN — ACETAMINOPHEN 1000 MG: 500 TABLET, FILM COATED ORAL at 22:03

## 2021-03-10 ASSESSMENT — MIFFLIN-ST. JEOR: SCORE: 1847.24

## 2021-03-11 ENCOUNTER — APPOINTMENT (OUTPATIENT)
Dept: GENERAL RADIOLOGY | Facility: CLINIC | Age: 21
End: 2021-03-11
Attending: EMERGENCY MEDICINE
Payer: COMMERCIAL

## 2021-03-11 ENCOUNTER — APPOINTMENT (OUTPATIENT)
Dept: CT IMAGING | Facility: CLINIC | Age: 21
End: 2021-03-11
Attending: EMERGENCY MEDICINE
Payer: COMMERCIAL

## 2021-03-11 VITALS
RESPIRATION RATE: 18 BRPM | SYSTOLIC BLOOD PRESSURE: 111 MMHG | HEIGHT: 62 IN | WEIGHT: 250 LBS | BODY MASS INDEX: 46.01 KG/M2 | TEMPERATURE: 97.5 F | OXYGEN SATURATION: 97 % | DIASTOLIC BLOOD PRESSURE: 60 MMHG | HEART RATE: 118 BPM

## 2021-03-11 LAB
ANION GAP SERPL CALCULATED.3IONS-SCNC: 7 MMOL/L (ref 3–14)
B-HCG FREE SERPL-ACNC: <5 IU/L
BASOPHILS # BLD AUTO: 0 10E9/L (ref 0–0.2)
BASOPHILS NFR BLD AUTO: 0.1 %
BUN SERPL-MCNC: 12 MG/DL (ref 7–30)
CALCIUM SERPL-MCNC: 9 MG/DL (ref 8.5–10.1)
CHLORIDE SERPL-SCNC: 103 MMOL/L (ref 94–109)
CO2 SERPL-SCNC: 24 MMOL/L (ref 20–32)
CREAT SERPL-MCNC: 0.52 MG/DL (ref 0.52–1.04)
DIFFERENTIAL METHOD BLD: ABNORMAL
EOSINOPHIL # BLD AUTO: 0 10E9/L (ref 0–0.7)
EOSINOPHIL NFR BLD AUTO: 0.1 %
ERYTHROCYTE [DISTWIDTH] IN BLOOD BY AUTOMATED COUNT: 13.1 % (ref 10–15)
GFR SERPL CREATININE-BSD FRML MDRD: >90 ML/MIN/{1.73_M2}
GLUCOSE SERPL-MCNC: 155 MG/DL (ref 70–99)
HCT VFR BLD AUTO: 42.7 % (ref 35–47)
HGB BLD-MCNC: 14 G/DL (ref 11.7–15.7)
IMM GRANULOCYTES # BLD: 0.2 10E9/L (ref 0–0.4)
IMM GRANULOCYTES NFR BLD: 0.8 %
LYMPHOCYTES # BLD AUTO: 1.3 10E9/L (ref 0.8–5.3)
LYMPHOCYTES NFR BLD AUTO: 6.1 %
MCH RBC QN AUTO: 28.7 PG (ref 26.5–33)
MCHC RBC AUTO-ENTMCNC: 32.8 G/DL (ref 31.5–36.5)
MCV RBC AUTO: 88 FL (ref 78–100)
MONOCYTES # BLD AUTO: 0.9 10E9/L (ref 0–1.3)
MONOCYTES NFR BLD AUTO: 4.2 %
NEUTROPHILS # BLD AUTO: 18.2 10E9/L (ref 1.6–8.3)
NEUTROPHILS NFR BLD AUTO: 88.7 %
NRBC # BLD AUTO: 0 10*3/UL
NRBC BLD AUTO-RTO: 0 /100
PLATELET # BLD AUTO: 322 10E9/L (ref 150–450)
POTASSIUM SERPL-SCNC: 3.6 MMOL/L (ref 3.4–5.3)
RBC # BLD AUTO: 4.87 10E12/L (ref 3.8–5.2)
SODIUM SERPL-SCNC: 134 MMOL/L (ref 133–144)
WBC # BLD AUTO: 20.6 10E9/L (ref 4–11)

## 2021-03-11 PROCEDURE — 250N000011 HC RX IP 250 OP 636: Performed by: EMERGENCY MEDICINE

## 2021-03-11 PROCEDURE — 84702 CHORIONIC GONADOTROPIN TEST: CPT

## 2021-03-11 PROCEDURE — 80048 BASIC METABOLIC PNL TOTAL CA: CPT | Performed by: EMERGENCY MEDICINE

## 2021-03-11 PROCEDURE — 250N000013 HC RX MED GY IP 250 OP 250 PS 637: Performed by: EMERGENCY MEDICINE

## 2021-03-11 PROCEDURE — 73130 X-RAY EXAM OF HAND: CPT | Mod: RT

## 2021-03-11 PROCEDURE — 85025 COMPLETE CBC W/AUTO DIFF WBC: CPT | Performed by: EMERGENCY MEDICINE

## 2021-03-11 PROCEDURE — 96360 HYDRATION IV INFUSION INIT: CPT | Mod: 59

## 2021-03-11 PROCEDURE — 74177 CT ABD & PELVIS W/CONTRAST: CPT

## 2021-03-11 PROCEDURE — 258N000003 HC RX IP 258 OP 636: Performed by: EMERGENCY MEDICINE

## 2021-03-11 PROCEDURE — 71046 X-RAY EXAM CHEST 2 VIEWS: CPT

## 2021-03-11 RX ORDER — IOPAMIDOL 755 MG/ML
500 INJECTION, SOLUTION INTRAVASCULAR ONCE
Status: COMPLETED | OUTPATIENT
Start: 2021-03-11 | End: 2021-03-11

## 2021-03-11 RX ORDER — OXYCODONE HYDROCHLORIDE 5 MG/1
5 TABLET ORAL ONCE
Status: COMPLETED | OUTPATIENT
Start: 2021-03-11 | End: 2021-03-11

## 2021-03-11 RX ADMIN — OXYCODONE HYDROCHLORIDE 5 MG: 5 TABLET ORAL at 00:22

## 2021-03-11 RX ADMIN — IOPAMIDOL 100 ML: 755 INJECTION, SOLUTION INTRAVENOUS at 00:43

## 2021-03-11 RX ADMIN — SODIUM CHLORIDE 65 ML: 9 INJECTION, SOLUTION INTRAVENOUS at 00:43

## 2021-03-11 ASSESSMENT — ENCOUNTER SYMPTOMS
ABDOMINAL PAIN: 1
NAUSEA: 1
ARTHRALGIAS: 1
NECK PAIN: 0
BACK PAIN: 0
MYALGIAS: 1
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
## 2021-03-11 NOTE — DISCHARGE INSTRUCTIONS
What do you do next:   Continue your home medications unless we have specifically changed them  You may use over-the-counter Tylenol and ibuprofen for pain control.  Follow the directions on the bottle exactly and do not take more than 3000 total milligrams of Tylenol in a 24-hour period.  Follow up as indicated below    When do you return: If you have uncontrollable pain, intractable vomiting, lightheadedness or fainting, or any other symptoms that concern you, please return to the ED for reevaluation.    Thank you for allowing us to care for you today.

## 2021-06-20 ENCOUNTER — HEALTH MAINTENANCE LETTER (OUTPATIENT)
Age: 21
End: 2021-06-20

## 2021-10-10 ENCOUNTER — HEALTH MAINTENANCE LETTER (OUTPATIENT)
Age: 21
End: 2021-10-10

## 2022-05-28 ENCOUNTER — APPOINTMENT (OUTPATIENT)
Dept: GENERAL RADIOLOGY | Facility: CLINIC | Age: 22
End: 2022-05-28
Attending: EMERGENCY MEDICINE
Payer: COMMERCIAL

## 2022-05-28 ENCOUNTER — HOSPITAL ENCOUNTER (EMERGENCY)
Facility: CLINIC | Age: 22
Discharge: HOME | End: 2022-05-28
Attending: EMERGENCY MEDICINE | Admitting: EMERGENCY MEDICINE
Payer: COMMERCIAL

## 2022-05-28 VITALS
OXYGEN SATURATION: 99 % | WEIGHT: 238 LBS | TEMPERATURE: 98.7 F | DIASTOLIC BLOOD PRESSURE: 80 MMHG | BODY MASS INDEX: 43.53 KG/M2 | SYSTOLIC BLOOD PRESSURE: 138 MMHG | HEART RATE: 104 BPM | RESPIRATION RATE: 18 BRPM

## 2022-05-28 DIAGNOSIS — U07.1 INFECTION DUE TO 2019 NOVEL CORONAVIRUS: ICD-10-CM

## 2022-05-28 DIAGNOSIS — J06.9 URI WITH COUGH AND CONGESTION: ICD-10-CM

## 2022-05-28 LAB
FLUAV RNA SPEC QL NAA+PROBE: NEGATIVE
FLUBV RNA RESP QL NAA+PROBE: NEGATIVE
RSV RNA SPEC NAA+PROBE: NEGATIVE
SARS-COV-2 RNA RESP QL NAA+PROBE: POSITIVE

## 2022-05-28 PROCEDURE — 250N000011 HC RX IP 250 OP 636: Performed by: EMERGENCY MEDICINE

## 2022-05-28 PROCEDURE — 71046 X-RAY EXAM CHEST 2 VIEWS: CPT

## 2022-05-28 PROCEDURE — 94640 AIRWAY INHALATION TREATMENT: CPT

## 2022-05-28 PROCEDURE — 250N000009 HC RX 250: Performed by: EMERGENCY MEDICINE

## 2022-05-28 PROCEDURE — 87637 SARSCOV2&INF A&B&RSV AMP PRB: CPT | Performed by: EMERGENCY MEDICINE

## 2022-05-28 PROCEDURE — 99284 EMERGENCY DEPT VISIT MOD MDM: CPT | Mod: 25

## 2022-05-28 RX ORDER — IPRATROPIUM BROMIDE AND ALBUTEROL SULFATE 2.5; .5 MG/3ML; MG/3ML
3 SOLUTION RESPIRATORY (INHALATION) ONCE
Status: COMPLETED | OUTPATIENT
Start: 2022-05-28 | End: 2022-05-28

## 2022-05-28 RX ADMIN — DEXAMETHASONE 10 MG: 2 TABLET ORAL at 15:33

## 2022-05-28 RX ADMIN — IPRATROPIUM BROMIDE AND ALBUTEROL SULFATE 3 ML: 2.5; .5 SOLUTION RESPIRATORY (INHALATION) at 15:40

## 2022-05-28 ASSESSMENT — ENCOUNTER SYMPTOMS
DYSURIA: 0
CHEST TIGHTNESS: 0
SHORTNESS OF BREATH: 1
VOMITING: 0
BACK PAIN: 0
HEMATURIA: 0
DIARRHEA: 1
ABDOMINAL PAIN: 0
CHILLS: 1
NAUSEA: 0
EYE REDNESS: 0
RHINORRHEA: 1
PALPITATIONS: 0
SINUS PAIN: 0
FEVER: 0

## 2022-05-28 NOTE — ED PROVIDER NOTES
History     Chief Complaint:  Shortness of Breath       HPI   Marilu Lozoya is a 21 year old female who presents with runny nose congestion and cough since working yesterday.  Had difficulty sleeping not because fo breathlessness but because of post nasal drip.  No wheezing not using inhaler.  Subj fever, body aches and chills.    ROS:  Review of Systems   Constitutional: Positive for chills. Negative for fever.   HENT: Positive for congestion, rhinorrhea and sneezing. Negative for ear pain and sinus pain.    Eyes: Negative for redness.   Respiratory: Positive for shortness of breath. Negative for chest tightness.    Cardiovascular: Negative for chest pain, palpitations and leg swelling.   Gastrointestinal: Positive for diarrhea. Negative for abdominal pain, nausea and vomiting.   Genitourinary: Negative for dysuria and hematuria.   Musculoskeletal: Negative for back pain.   Skin: Negative for rash.         Allergies:  No Known Allergies     Medications:    albuterol (PROAIR HFA/PROVENTIL HFA/VENTOLIN HFA) 108 (90 Base) MCG/ACT inhaler  metFORMIN (GLUCOPHAGE-XR) 500 MG 24 hr tablet        Past Medical History:    Past Medical History:   Diagnosis Date     Uncomplicated asthma        Past Surgical History:    Past Surgical History:   Procedure Laterality Date     LAPAROSCOPIC CHOLECYSTECTOMY N/A 10/15/2019    Procedure: CHOLECYSTECTOMY, LAPAROSCOPIC;  Surgeon: Michelle Martel MD;  Location:  OR        Family History:    family history is not on file.    Social History:   reports that she quit smoking about 3 years ago. She smoked 0.00 packs per day. She has never used smokeless tobacco. She reports current alcohol use. She reports that she does not use drugs.  PCP: Fiorella Hughes     Physical Exam     Patient Vitals for the past 24 hrs:   BP Temp Temp src Pulse Resp SpO2 Weight   05/28/22 1516 138/80 98.7  F (37.1  C) Oral 104 18 99 % 108 kg (238 lb)        Physical Exam  Constitutional: Patient is well  appearing. No distress.  Head: Atraumatic.  Eyes: Conjunctivae are normal. No scleral icterus.  Cardiovascular: Normal rate, regular rhythm, normal heart sounds and intact distal perfusion.   Pulmonary/Chest: Breath sounds normal. No respiratory distress.  Abdominal: Soft. Bowel sounds are normal. No distension. No tenderness. No rebound or guarding.   Musculoskeletal: Normal range of motion. No edema or tenderness.   Neurological: Alert and orientated to person, place, and time. No observable focal neuro deficit  Skin: Warm and dry. No rash noted. Not diaphoretic.     Emergency Department Course   ECG:  ECG results from 10/15/19   EKG 12-lead, tracing only     Value    Interpretation ECG Click View Image link to view waveform and result       Imaging:  XR Chest 2 Views   Final Result   IMPRESSION: Negative chest.         Report per radiology    Laboratory:  Labs Ordered and Resulted from Time of ED Arrival to Time of ED Departure   INFLUENZA A/B & SARS-COV2 PCR MULTIPLEX - Abnormal       Result Value    Influenza A PCR Negative      Influenza B PCR Negative      RSV PCR Negative      SARS CoV2 PCR Positive (*)         Procedures       Emergency Department Course:             Reviewed:  I reviewed nursing notes, vitals and past medical history    Assessments:   I obtained history and examined the patient as noted above.    I rechecked the patien and explained findings.       Consults:       Interventions:  Medications   ipratropium - albuterol 0.5 mg/2.5 mg/3 mL (DUONEB) neb solution 3 mL (3 mLs Nebulization Given 5/28/22 1540)   dexamethasone (DECADRON) tablet 10 mg (10 mg Oral Given 5/28/22 1533)        Disposition:  The patient was discharged to home.     Impression & Plan        Medical Decision Making:  Vaccinated with no red flags for covid.  Feels reassured here home with pulse ox and standard expectant instructions for covid.  Doubt other CV resp with congestion viral like illness and covid + test.  Strict  return and F/u given.    Diagnosis:    ICD-10-CM    1. URI with cough and congestion  J06.9    2. Infection due to 2019 novel coronavirus  U07.1         Discharge Medications:  New Prescriptions    No medications on file        5/28/2022   Baljeet Kim MD Stevens, Andrew C, MD  05/28/22 0368

## 2022-05-28 NOTE — ED TRIAGE NOTES
Patient states she has felt SOB since yesterday, patient has an inhaler at home but has not used it. Patient states it is worse when she is sleeping.      Triage Assessment     Row Name 05/28/22 7843       Triage Assessment (Adult)    Airway WDL WDL       Respiratory WDL    Respiratory WDL WDL       Cardiac WDL    Cardiac WDL WDL       Peripheral/Neurovascular WDL    Peripheral Neurovascular WDL WDL

## 2022-05-28 NOTE — LETTER
May 28, 2022      To Whom It May Concern:      Marilu Lozoya was seen in our Emergency Department today, 05/28/22.  I expect her condition to improve over the next 10 days.  She may return to work/school when improved.    Sincerely,        Quan Valadez RN

## 2022-07-16 ENCOUNTER — HEALTH MAINTENANCE LETTER (OUTPATIENT)
Age: 22
End: 2022-07-16

## 2022-09-18 ENCOUNTER — HEALTH MAINTENANCE LETTER (OUTPATIENT)
Age: 22
End: 2022-09-18

## 2022-12-02 ENCOUNTER — HOSPITAL ENCOUNTER (EMERGENCY)
Facility: CLINIC | Age: 22
Discharge: HOME | End: 2022-12-02
Attending: EMERGENCY MEDICINE | Admitting: EMERGENCY MEDICINE
Payer: COMMERCIAL

## 2022-12-02 VITALS
OXYGEN SATURATION: 98 % | HEART RATE: 105 BPM | RESPIRATION RATE: 18 BRPM | SYSTOLIC BLOOD PRESSURE: 116 MMHG | TEMPERATURE: 98.1 F | DIASTOLIC BLOOD PRESSURE: 74 MMHG

## 2022-12-02 DIAGNOSIS — Z87.09 HISTORY OF REACTIVE AIRWAY DISEASE: ICD-10-CM

## 2022-12-02 DIAGNOSIS — J10.1 INFLUENZA A: ICD-10-CM

## 2022-12-02 LAB
DEPRECATED S PYO AG THROAT QL EIA: NEGATIVE
FLUAV RNA SPEC QL NAA+PROBE: POSITIVE
FLUBV RNA RESP QL NAA+PROBE: NEGATIVE
GROUP A STREP BY PCR: NOT DETECTED
RSV RNA SPEC NAA+PROBE: NEGATIVE
SARS-COV-2 RNA RESP QL NAA+PROBE: NEGATIVE

## 2022-12-02 PROCEDURE — 87637 SARSCOV2&INF A&B&RSV AMP PRB: CPT | Performed by: EMERGENCY MEDICINE

## 2022-12-02 PROCEDURE — 99283 EMERGENCY DEPT VISIT LOW MDM: CPT | Mod: CS

## 2022-12-02 PROCEDURE — 87651 STREP A DNA AMP PROBE: CPT | Performed by: EMERGENCY MEDICINE

## 2022-12-02 PROCEDURE — C9803 HOPD COVID-19 SPEC COLLECT: HCPCS

## 2022-12-02 RX ORDER — OSELTAMIVIR PHOSPHATE 75 MG/1
75 CAPSULE ORAL 2 TIMES DAILY
Qty: 10 CAPSULE | Refills: 0 | Status: SHIPPED | OUTPATIENT
Start: 2022-12-02 | End: 2022-12-07

## 2022-12-02 NOTE — ED TRIAGE NOTES
Patient reports 3 days of cough, sore throat, and shortness of breath. Patient denies hx of breathing issues.      Triage Assessment     Row Name 12/02/22 1323       Triage Assessment (Adult)    Airway WDL WDL       Respiratory WDL    Respiratory WDL WDL       Skin Circulation/Temperature WDL    Skin Circulation/Temperature WDL WDL       Cardiac WDL    Cardiac WDL WDL       Peripheral/Neurovascular WDL    Peripheral Neurovascular WDL WDL       Cognitive/Neuro/Behavioral WDL    Cognitive/Neuro/Behavioral WDL WDL

## 2022-12-02 NOTE — Clinical Note
Marilu Lozoya was seen and treated in our emergency department on 12/2/2022.  She may return to work on 12/06/2022.       If you have any questions or concerns, please don't hesitate to call.      Simon Westbrook MD

## 2022-12-02 NOTE — ED PROVIDER NOTES
History   Chief Complaint:  Cough     HPI   History supplemented by electronic chart review    Marilu Lozoya is a 22 year old female with history of possible asthma who presents for evaluation of 3 days of dry cough, body aches, malaise, and sore throat for which she is taking occasional DayQuil and NyQuil with incomplete relief.  She denies any concern for possible pregnancy.  Chart shows a history of asthma and she states she has used an inhaler in the past that was not formally diagnosed with asthma.  Non-smoker.  Multiple coworkers are currently ill with influenza.  She is not vaccinated.  She would like to take Tamiflu.    Review of Systems  All other systems reviewed and negative except as above in HPI  Allergies:  No Known Allergies    Medications:  Patient is not taking any daily medications.     Past Medical History:     Calculus of gallbladder   Cholecystitis  Prediabetes  Atypical squamous cells   Anxiety disorder  Reactive airway disease without complication  Abnormal menstrual cycle  Menorrhagia  Hyperlipidemia  Overweight     Past Surgical History:    Laparoscopic cholecystectomy    Social History:  Works at [employer]    Physical Exam     Patient Vitals for the past 24 hrs:   BP Temp Temp src Pulse Resp SpO2   12/02/22 1324 116/74 98.1  F (36.7  C) Oral 105 18 98 %     Physical Exam  Gen: Nontoxic-appearing woman sitting upright in room 46  HENT: mucous membranes moist, mastoids nontender, OP clear without swelling or exudate  Eyes: pupils normal, no scleral injection  CV: regular rhythm, cap refill normal  Resp: normal effort, speaks in full phrases, no stridor, occasional dry cough observed  GI: abdomen soft and nontender, no guarding  MSK: no bony tenderness  Skin: appropriately warm and dry, no ecchymosis, no petechiae  Neuro: awake, alert, normal tone in extremities, no meningismus  Psych: calm, cooperative    Emergency Department Course   Laboratory:  Labs Ordered and Resulted from Time of ED  Arrival to Time of ED Departure   INFLUENZA A/B & SARS-COV2 PCR MULTIPLEX - Abnormal       Result Value    Influenza A PCR Positive (*)     Influenza B PCR Negative      RSV PCR Negative      SARS CoV2 PCR Negative     STREPTOCOCCUS A RAPID SCREEN W REFELX TO PCR - Normal    Group A Strep antigen Negative     GROUP A STREPTOCOCCUS PCR THROAT SWAB      Emergency Department Course:     Reviewed:  I reviewed nursing notes, vitals, past medical history and Care Everywhere    Assessments:  1600 I obtained history and examined the patient as noted above.    I rechecked the patient and explained findings.     Disposition:  The patient was discharged to home.     Impression & Plan   Medical Decision Making:  Presentation compatible with influenza illness.  Lung sounds are clear, oxygen is good, blood pressure satisfactory, she is tolerating oral intake and the patient and I both think that she is appropriate candidate for ongoing outpatient management.  Potential risk and benefits of Tamiflu were reviewed, this was prescribed given risk factors of increased BMI and history of reactive airway disease, though she does not have wheezing or other reason to suspect a primary component of bronchospasm nor the need for steroids at this time.  Highly doubt bacterial pneumonia, no role for chest x-ray at this time.  Work note provided, return precautions and workplace return timing discussed with her.    Diagnosis:    ICD-10-CM    1. Influenza A  J10.1       2. History of reactive airway disease  Z87.09           Discharge Medications:  New Prescriptions    OSELTAMIVIR (TAMIFLU) 75 MG CAPSULE    Take 1 capsule (75 mg) by mouth 2 times daily for 5 days       Due to hospital and departmental capacity constraints and prolonged wait times, this patient was evaluated in non-traditional circumstances such as in triage/waiting room, a hallway, etc. I explained the option to wait for a traditional treatment space and apologized for the  inconvenience. Given the circumstances, every attempt was made to provide for the patient's comfort and privacy and to perform the most thorough evaluation possible.      Scribe Disclosure:  I, Adalgisa Mccallum, am serving as a scribe at 3:56 PM on 12/2/2022 to document services personally performed by Simon Westbrook MD, based on my observations and the provider's statements to me.        Simon Westbrook MD  12/02/22 3392

## 2023-03-17 ENCOUNTER — HOSPITAL ENCOUNTER (EMERGENCY)
Facility: CLINIC | Age: 23
Discharge: HOME | End: 2023-03-17
Attending: EMERGENCY MEDICINE | Admitting: EMERGENCY MEDICINE
Payer: COMMERCIAL

## 2023-03-17 ENCOUNTER — APPOINTMENT (OUTPATIENT)
Dept: GENERAL RADIOLOGY | Facility: CLINIC | Age: 23
End: 2023-03-17
Attending: EMERGENCY MEDICINE
Payer: COMMERCIAL

## 2023-03-17 VITALS
HEART RATE: 88 BPM | RESPIRATION RATE: 16 BRPM | SYSTOLIC BLOOD PRESSURE: 125 MMHG | DIASTOLIC BLOOD PRESSURE: 76 MMHG | TEMPERATURE: 98.7 F | OXYGEN SATURATION: 100 %

## 2023-03-17 DIAGNOSIS — J11.1 INFLUENZA-LIKE ILLNESS: ICD-10-CM

## 2023-03-17 LAB
DEPRECATED S PYO AG THROAT QL EIA: NEGATIVE
FLUAV RNA SPEC QL NAA+PROBE: NEGATIVE
FLUBV RNA RESP QL NAA+PROBE: NEGATIVE
GROUP A STREP BY PCR: NOT DETECTED
RSV RNA SPEC NAA+PROBE: NEGATIVE
SARS-COV-2 RNA RESP QL NAA+PROBE: NEGATIVE

## 2023-03-17 PROCEDURE — 87637 SARSCOV2&INF A&B&RSV AMP PRB: CPT | Performed by: EMERGENCY MEDICINE

## 2023-03-17 PROCEDURE — 71046 X-RAY EXAM CHEST 2 VIEWS: CPT

## 2023-03-17 PROCEDURE — 87651 STREP A DNA AMP PROBE: CPT | Performed by: EMERGENCY MEDICINE

## 2023-03-17 PROCEDURE — 99284 EMERGENCY DEPT VISIT MOD MDM: CPT | Mod: 25,CS

## 2023-03-17 PROCEDURE — C9803 HOPD COVID-19 SPEC COLLECT: HCPCS

## 2023-03-17 RX ORDER — BENZONATATE 200 MG/1
200 CAPSULE ORAL 3 TIMES DAILY PRN
Qty: 21 CAPSULE | Refills: 0 | Status: SHIPPED | OUTPATIENT
Start: 2023-03-17

## 2023-03-17 RX ORDER — BENZONATATE 200 MG/1
200 CAPSULE ORAL 3 TIMES DAILY PRN
Qty: 21 CAPSULE | Refills: 0 | Status: SHIPPED | OUTPATIENT
Start: 2023-03-17 | End: 2023-03-17

## 2023-03-17 ASSESSMENT — ACTIVITIES OF DAILY LIVING (ADL): ADLS_ACUITY_SCORE: 33

## 2023-03-17 NOTE — ED PROVIDER NOTES
History     Chief Complaint:  Shortness of Breath       HPI   Marilu Lozoya is a 22 year old female who presents with 3 days of cough sore throat.  Patient feels short of breath when laying down at night and is waking up coughing.  Cough is productive of mucus.  Feels warm but no temperature taken.  No vomiting or diarrhea.  Taking fluids okay.  No chest pain.  No history of PE or DVT.  Does have a history of asthma but does not have a prescription for inhaler.    ROS:  Review of Systems  See HPI    Allergies:  No Known Allergies     Medications:    benzonatate (TESSALON) 200 MG capsule  albuterol (PROAIR HFA/PROVENTIL HFA/VENTOLIN HFA) 108 (90 Base) MCG/ACT inhaler  metFORMIN (GLUCOPHAGE-XR) 500 MG 24 hr tablet        Past Medical History:    Past Medical History:   Diagnosis Date     Uncomplicated asthma        Past Surgical History:    Past Surgical History:   Procedure Laterality Date     LAPAROSCOPIC CHOLECYSTECTOMY N/A 10/15/2019    Procedure: CHOLECYSTECTOMY, LAPAROSCOPIC;  Surgeon: Michelle Martel MD;  Location:  OR        Family History:    family history is not on file.    Social History:   reports that she quit smoking about 4 years ago. She has never used smokeless tobacco. She reports current alcohol use. She reports that she does not use drugs.  PCP: No Ref-Primary, Physician     Physical Exam     Patient Vitals for the past 24 hrs:   BP Temp Temp src Pulse Resp SpO2   03/17/23 0422 125/76 -- -- 88 16 100 %   03/17/23 0234 124/81 98.7  F (37.1  C) Temporal 99 20 98 %        Physical Exam  Gen: alert, answers all questions appropriately.   HEENT: PERRL. no tonsillar exudate and erythema. No trismus, uvula midline, oropharynx symmetric   Neck: full AROM  CV: RRR, no murmurs   Pulm: breath sounds equal, lungs clear  Skin: facial skin normal  Neuro: CN 5 and 7 normal      Emergency Department Course     Imaging:  Chest XR,  PA & LAT   Final Result   IMPRESSION: Negative chest.              Laboratory:  Labs Ordered and Resulted from Time of ED Arrival to Time of ED Departure   INFLUENZA A/B, RSV, & SARS-COV2 PCR - Normal       Result Value    Influenza A PCR Negative      Influenza B PCR Negative      RSV PCR Negative      SARS CoV2 PCR Negative     STREPTOCOCCUS A RAPID SCREEN W REFELX TO PCR - Normal    Group A Strep antigen Negative     GROUP A STREPTOCOCCUS PCR THROAT SWAB      Interventions:  Medications - No data to display     Impression & Plan      Medical Decision Making:  Marilu Lozoya is a 22 year old female who presents for evaluation of cough and congetsion.  This is consistent with an upper respiratory tract infection.  There is no signs at this point of serious bacterial infection such as OM, RPA, epiglottitis, PTA, strep pharyngitis, pneumonia, sinusitis, meningitis, bacteremia, serious bacterial infection.    Given productive cough and SOB , I did a CXR to eval for pneumonia.     I indpendently reviewed the CXR and my interpretation is no infiltrate.    There are no gastrointestinal symptoms at this point and no signs of dehydration.  Close followup with primary care physician is indicated.  Return to ED for new fever, protracted vomiting, confusion or increasing shortness of breath.            Diagnosis:    ICD-10-CM    1. Influenza-like illness  J11.1            Discharge Medications:  Discharge Medication List as of 3/17/2023  4:16 AM      START taking these medications    Details   benzonatate (TESSALON) 200 MG capsule Take 1 capsule (200 mg) by mouth 3 times daily as needed for cough, Disp-21 capsule, R-0, Local Print                 Kymberly Ni MD  03/17/23 8525

## 2023-03-17 NOTE — LETTER
March 17, 2023      To Whom It May Concern:      Marilu Lozoya was seen in our Emergency Department today, 03/17/23.  I expect her condition to improve over the next 2 days.  She may return to work/school when improved.    Sincerely,        Sarath Lozano RN

## 2023-03-17 NOTE — DISCHARGE INSTRUCTIONS
Discharge Instructions  Influenza LIke Illness    You were diagnosed today with influenza or influenza like illness.  Influenza is a respiratory (breathing) illness caused by influenza A or B viruses.  Influenza causes five primary symptoms: fever, headache, muscle aches/fatigue/malaise, sore throat and cough.  These symptoms start one to four days after you have been around a person with this illness. Influenza is spread through sneezing and coughing and can live on surfaces for several days.  It is usually contagious for 5 days but in some cases up to 10 days and often affects several family members. If you have a family member who is less than 2 years old, older than 65 years old, pregnant or has a serious medical condition, they should be seen right away by a provider to decide if they should take preventative medications. Although influenza will make you feel very ill, most patients don t require any specific treatment. An antiviral medication might be prescribed for certain groups of patients (older patients, younger patients, and those with certain chronic medical problems).    Generally, every Emergency Department visit should have a follow-up clinic visit with either a primary or a specialty clinic/provider. Please follow-up as instructed by your emergency provider today.    Return to the Emergency Department if:  You have trouble breathing.  You develop pain in your chest.  You have signs of being dehydrated, such as dizziness or unable to urinate (pee) at least three times daily.  You are confused or severely weak.  You cannot stop vomiting (throwing up) or you cannot drink enough fluids.    In children, you should seek help if the child has any of the above or if child:  Has blue or purplish skin color.  Is so irritable that he or she does not want to be held.  Does not have tears when crying (in infants) or does not urinate at least three times daily.  Does not wake up easily.    What can I do to help  myself?  Rest.  Fluids -- Drink hydrating solutions such as Gatorade  or Pedialyte  as often as you can. If you are drinking enough, you should pass urine at least every eight hours.  Tylenol  (acetaminophen) and Advil  (ibuprofen) can relieve fever, headache, and muscle aches. Do not give aspirin to children under 18 years old.   Antiviral treatment -- Antiviral medicines do not make the flu symptoms go away immediately.  They have only been shown to make the symptoms go away 12 to 24 hours sooner than they would without treatment.     Antibiotics -- Antibiotics are NOT useful for treating viral illnesses such as influenza. Antibiotics should only be used if there is a bacterial complication of the flu such as bacterial pneumonia, ear infection, or sinusitis.  Because you were diagnosed with a flu like illness you are very contagious.  This means you cannot work, attend school or  for at least 24 hours or until you no longer have a fever.  If you were given a prescription for medicine here today, be sure to read all of the information (including the package insert) that comes with your prescription.  This will include important information about the medicine, its side effects, and any warnings that you need to know about.  The pharmacist who fills the prescription can provide more information and answer questions you may have about the medicine.  If you have questions or concerns that the pharmacist cannot address, please call or return to the Emergency Department.   Remember that you can always come back to the Emergency Department if you are not able to see your regular provider in the amount of time listed above, if you get any new symptoms, or if there is anything that worries you.

## 2023-03-30 ENCOUNTER — OFFICE VISIT (OUTPATIENT)
Dept: INTERNAL MEDICINE | Facility: CLINIC | Age: 23
End: 2023-03-30
Payer: COMMERCIAL

## 2023-03-30 ENCOUNTER — MYC MEDICAL ADVICE (OUTPATIENT)
Dept: INTERNAL MEDICINE | Facility: CLINIC | Age: 23
End: 2023-03-30

## 2023-03-30 VITALS
WEIGHT: 217.3 LBS | TEMPERATURE: 97.2 F | SYSTOLIC BLOOD PRESSURE: 112 MMHG | HEIGHT: 64 IN | BODY MASS INDEX: 37.1 KG/M2 | RESPIRATION RATE: 16 BRPM | DIASTOLIC BLOOD PRESSURE: 74 MMHG | OXYGEN SATURATION: 98 % | HEART RATE: 83 BPM

## 2023-03-30 DIAGNOSIS — F41.9 ANXIETY: ICD-10-CM

## 2023-03-30 DIAGNOSIS — E66.01 MORBID OBESITY (H): ICD-10-CM

## 2023-03-30 DIAGNOSIS — R06.02 SHORTNESS OF BREATH: ICD-10-CM

## 2023-03-30 DIAGNOSIS — Z11.3 SCREENING FOR STDS (SEXUALLY TRANSMITTED DISEASES): ICD-10-CM

## 2023-03-30 DIAGNOSIS — Z13.220 SCREENING FOR LIPID DISORDERS: ICD-10-CM

## 2023-03-30 DIAGNOSIS — Z11.59 NEED FOR HEPATITIS C SCREENING TEST: ICD-10-CM

## 2023-03-30 DIAGNOSIS — Z11.4 SCREENING FOR HIV (HUMAN IMMUNODEFICIENCY VIRUS): ICD-10-CM

## 2023-03-30 DIAGNOSIS — Z00.00 ROUTINE GENERAL MEDICAL EXAMINATION AT A HEALTH CARE FACILITY: Primary | ICD-10-CM

## 2023-03-30 DIAGNOSIS — Z30.9 ENCOUNTER FOR CONTRACEPTIVE MANAGEMENT, UNSPECIFIED TYPE: ICD-10-CM

## 2023-03-30 LAB
ANION GAP SERPL CALCULATED.3IONS-SCNC: 12 MMOL/L (ref 7–15)
BUN SERPL-MCNC: 11.6 MG/DL (ref 6–20)
CALCIUM SERPL-MCNC: 9.6 MG/DL (ref 8.6–10)
CHLORIDE SERPL-SCNC: 105 MMOL/L (ref 98–107)
CHOLEST SERPL-MCNC: 132 MG/DL
CREAT SERPL-MCNC: 0.66 MG/DL (ref 0.51–0.95)
DEPRECATED HCO3 PLAS-SCNC: 23 MMOL/L (ref 22–29)
ERYTHROCYTE [DISTWIDTH] IN BLOOD BY AUTOMATED COUNT: 13.6 % (ref 10–15)
GFR SERPL CREATININE-BSD FRML MDRD: >90 ML/MIN/1.73M2
GLUCOSE SERPL-MCNC: 117 MG/DL (ref 70–99)
HCT VFR BLD AUTO: 39.6 % (ref 35–47)
HDLC SERPL-MCNC: 27 MG/DL
HGB BLD-MCNC: 13.1 G/DL (ref 11.7–15.7)
LDLC SERPL CALC-MCNC: 77 MG/DL
MCH RBC QN AUTO: 28.5 PG (ref 26.5–33)
MCHC RBC AUTO-ENTMCNC: 33.1 G/DL (ref 31.5–36.5)
MCV RBC AUTO: 86 FL (ref 78–100)
NONHDLC SERPL-MCNC: 105 MG/DL
PLATELET # BLD AUTO: 326 10E3/UL (ref 150–450)
POTASSIUM SERPL-SCNC: 4.2 MMOL/L (ref 3.4–5.3)
RBC # BLD AUTO: 4.59 10E6/UL (ref 3.8–5.2)
SODIUM SERPL-SCNC: 140 MMOL/L (ref 136–145)
TRIGL SERPL-MCNC: 139 MG/DL
WBC # BLD AUTO: 8.1 10E3/UL (ref 4–11)

## 2023-03-30 PROCEDURE — 36415 COLL VENOUS BLD VENIPUNCTURE: CPT | Performed by: INTERNAL MEDICINE

## 2023-03-30 PROCEDURE — 87491 CHLMYD TRACH DNA AMP PROBE: CPT | Performed by: INTERNAL MEDICINE

## 2023-03-30 PROCEDURE — 87591 N.GONORRHOEAE DNA AMP PROB: CPT | Performed by: INTERNAL MEDICINE

## 2023-03-30 PROCEDURE — 80061 LIPID PANEL: CPT | Performed by: INTERNAL MEDICINE

## 2023-03-30 PROCEDURE — 80048 BASIC METABOLIC PNL TOTAL CA: CPT | Performed by: INTERNAL MEDICINE

## 2023-03-30 PROCEDURE — 99214 OFFICE O/P EST MOD 30 MIN: CPT | Mod: 25 | Performed by: INTERNAL MEDICINE

## 2023-03-30 PROCEDURE — 99395 PREV VISIT EST AGE 18-39: CPT | Performed by: INTERNAL MEDICINE

## 2023-03-30 PROCEDURE — 87389 HIV-1 AG W/HIV-1&-2 AB AG IA: CPT | Performed by: INTERNAL MEDICINE

## 2023-03-30 PROCEDURE — 86803 HEPATITIS C AB TEST: CPT | Performed by: INTERNAL MEDICINE

## 2023-03-30 PROCEDURE — 85027 COMPLETE CBC AUTOMATED: CPT | Performed by: INTERNAL MEDICINE

## 2023-03-30 RX ORDER — ALBUTEROL SULFATE 90 UG/1
1-2 AEROSOL, METERED RESPIRATORY (INHALATION) EVERY 4 HOURS PRN
Qty: 18 G | Refills: 0 | Status: SHIPPED | OUTPATIENT
Start: 2023-03-30

## 2023-03-30 RX ORDER — HYDROXYZINE HYDROCHLORIDE 25 MG/1
25 TABLET, FILM COATED ORAL 3 TIMES DAILY PRN
Qty: 20 TABLET | Refills: 0 | Status: SHIPPED | OUTPATIENT
Start: 2023-03-30

## 2023-03-30 ASSESSMENT — ENCOUNTER SYMPTOMS
CHILLS: 0
PARESTHESIAS: 0
SORE THROAT: 0
CONSTIPATION: 0
HEMATURIA: 0
PALPITATIONS: 0
SHORTNESS OF BREATH: 0
ARTHRALGIAS: 0
MYALGIAS: 0
COUGH: 0
EYE PAIN: 0
JOINT SWELLING: 0
NAUSEA: 0
NERVOUS/ANXIOUS: 1
BREAST MASS: 0
HEARTBURN: 0
HEMATOCHEZIA: 0
FEVER: 0
FREQUENCY: 0
DIARRHEA: 1
WEAKNESS: 0
HEADACHES: 0
ABDOMINAL PAIN: 0

## 2023-03-30 ASSESSMENT — PATIENT HEALTH QUESTIONNAIRE - PHQ9
SUM OF ALL RESPONSES TO PHQ QUESTIONS 1-9: 18
10. IF YOU CHECKED OFF ANY PROBLEMS, HOW DIFFICULT HAVE THESE PROBLEMS MADE IT FOR YOU TO DO YOUR WORK, TAKE CARE OF THINGS AT HOME, OR GET ALONG WITH OTHER PEOPLE: VERY DIFFICULT
SUM OF ALL RESPONSES TO PHQ QUESTIONS 1-9: 18

## 2023-03-30 ASSESSMENT — PAIN SCALES - GENERAL: PAINLEVEL: NO PAIN (0)

## 2023-03-30 NOTE — PROGRESS NOTES
SUBJECTIVE:   CC: Marilu is an 22 year old who presents for preventive health visit.   Additional Questions 3/30/2023   Roomed by Britney     Patient has been advised of split billing requirements and indicates understanding: Yes  Healthy Habits:     Getting at least 3 servings of Calcium per day:  NO    Bi-annual eye exam:  NO    Dental care twice a year:  Yes    Sleep apnea or symptoms of sleep apnea:  Daytime drowsiness    Diet:  Low salt and Low fat/cholesterol    Frequency of exercise:  2-3 days/week    Duration of exercise:  30-45 minutes    Taking medications regularly:  Yes    Medication side effects:  None    PHQ-2 Total Score: 3    Additional concerns today:  No      Anxiety-was seen in 2021 at [facility] where she was recommended for therapy which was at [place] so she did not go for it due to safety concerns with the location/city.remembers being on antidepressant and Has been on zoloft before and felt overmedicated and has hesitance to medication since then.  Does take metformin which she was started on for prediabetes as well as elevated BMI.  Currently works out everyday at least 30 minutes.  Uses albuterol inhaler due to wheezing and shortness of breath 2/2 allergies and anxiety.      Today's PHQ-2 Score:   PHQ-2 ( 1999 Pfizer) 3/30/2023   Q1: Little interest or pleasure in doing things 0   Q2: Feeling down, depressed or hopeless 3   PHQ-2 Score 3   Q1: Little interest or pleasure in doing things Not at all   Q2: Feeling down, depressed or hopeless More than half the days   PHQ-2 Score 2       PHQ-9 score:    PHQ 3/30/2023   PHQ-9 Total Score 18   Q9: Thoughts of better off dead/self-harm past 2 weeks Not at all             SARA-7    Over the last 2 weeks, how often have you been bothered by the following problems?   Not at all -0       Several days -1                  More than half the days-2   Nearly every day -3    1. Feeling nervous, anxious or on edge 1     2. Not being able to  stop or control worrying 3     3. Worrying too much about different things 3     4. Trouble relaxing    2  5. Being so restless that it is hard to sit still 0     6. Becoming easily annoyed or irritable 1     7. Feeling afraid as if something awful might happen 1        Total___11____    Cut points for:   Mild Anxiety =  5  Moderate= 10  Severe=  15    Have you ever done Advance Care Planning? (For example, a Health Directive, POLST, or a discussion with a medical provider or your loved ones about your wishes): NO    Social History     Tobacco Use     Smoking status: Former     Packs/day: 0.00     Types: Cigarettes     Quit date: 10/11/2018     Years since quittin.4     Smokeless tobacco: Never   Substance Use Topics     Alcohol use: Yes     Comment: occas         Alcohol Use 3/30/2023   Prescreen: >3 drinks/day or >7 drinks/week? No   Answers for HPI/ROS submitted by the patient on 3/30/2023  If you checked off any problems, how difficult have these problems made it for you to do your work, take care of things at home, or get along with other people?: Very difficult  PHQ9 TOTAL SCORE: 18      Reviewed orders with patient.  Reviewed health maintenance and updated orders accordingly - Yes  Lab work is in process    Breast Cancer Screening:        History of abnormal Pap smear:had ASCUS in 2019 and follow up pap in 2020 which was normal.    Reviewed and updated as needed this visit by clinical staff   Tobacco  Allergies  Meds              Reviewed and updated as needed this visit by Provider                   Review of Systems   Constitutional: Negative for chills and fever.   HENT: Negative for congestion, ear pain, hearing loss and sore throat.    Eyes: Negative for pain and visual disturbance.   Respiratory: Negative for cough and shortness of breath.    Cardiovascular: Negative for chest pain, palpitations and peripheral edema.   Gastrointestinal: Positive for diarrhea. Negative for abdominal pain,  "constipation, heartburn, hematochezia and nausea.   Breasts:  Negative for tenderness, breast mass and discharge.   Genitourinary: Positive for vaginal discharge. Negative for frequency, genital sores, hematuria, pelvic pain and vaginal bleeding.   Musculoskeletal: Negative for arthralgias, joint swelling and myalgias.   Skin: Negative for rash.   Neurological: Negative for weakness, headaches and paresthesias.   Psychiatric/Behavioral: Negative for mood changes. The patient is nervous/anxious.           OBJECTIVE:   /74   Pulse 83   Temp 97.2  F (36.2  C) (Tympanic)   Resp 16   Ht 1.613 m (5' 3.5\")   Wt 98.6 kg (217 lb 4.8 oz)   LMP 03/25/2023 (Exact Date)   SpO2 98%   BMI 37.89 kg/m    Physical Exam  GENERAL: healthy, alert and no distress  EYES: Eyes grossly normal to inspection, PERRL and conjunctivae and sclerae normal  HENT: ear canals and TM's normal, nose and mouth without ulcers or lesions  RESP: lungs clear to auscultation - no rales, rhonchi or wheezes  CV: regular rate and rhythm, normal S1 S2  ABDOMEN: soft, nontender, no hepatosplenomegaly, no masses   MS: no gross musculoskeletal defects noted, no edema  SKIN: no suspicious lesions or rashes  NEURO: Normal strength and tone, mentation intact and speech normal  PSYCH: mentation appears normal, affect normal/bright        ASSESSMENT/PLAN:   (Z00.00) Routine general medical examination at a health care facility  (primary encounter diagnosis)  Comment: fasting for lab work which has been ordered today.  Plan: Basic metabolic panel  (Ca, Cl, CO2, Creat,         Gluc, K, Na, BUN), CBC with platelets            (Z11.3) Screening for STDs (sexually transmitted diseases)  Plan: NEISSERIA GONORRHOEA PCR, CHLAMYDIA TRACHOMATIS        PCR            (Z11.4) Screening for HIV (human immunodeficiency virus)  Plan: HIV Antigen Antibody Combo          (Z11.59) Need for hepatitis C screening test  Plan: Hepatitis C Screen Reflex to HCV RNA Quant and      "    Genotype            (F41.9) Anxiety  Comment: elevated SARA and PHQ9 scores in office today.   was seen in 2021 at [facility] where she was recommended for therapy which was at [place] so she did not go for it due to safety concerns with the location/city.remembers being on antidepressant and Has been on zoloft before and felt overmedicated and has hesitance to medication since then.  Will refer to psychotherapy and provide hydroxyzine for PRN use. Anticipated side effects discussed today with patient. selective serotonin reuptake inhibitor options including prozac were discussed and she wants to hold off on that for now.   Plan: hydrOXYzine (ATARAX) 25 MG tablet, Adult Mental        Health [facility] Referral          (R06.02) Shortness of breath  Plan: albuterol (PROAIR HFA/PROVENTIL HFA/VENTOLIN         HFA) 108 (90 Base) MCG/ACT inhaler            (Z30.9) Encounter for contraceptive management, unspecified type  Plan: Ob/Gyn Referral            (Z13.220) Screening for lipid disorders  Plan: Lipid panel reflex to direct LDL Fasting            (E66.01) Morbid obesity (H)  Comment: regular exercise and dietary restrictions.      Patient has been advised of split billing requirements and indicates understanding: Yes      COUNSELING:       Regular exercise       Healthy diet/nutrition       Contraception        She reports that she quit smoking about 4 years ago. Her smoking use included cigarettes. She has never used smokeless tobacco.          Estrellita Gatica MD  [facility]

## 2023-03-30 NOTE — LETTER
To whom it may concern,    The patient is followed up at internal medicine office at the [facility]. She is currently getting treated for anxiety with therapy sessions and hydroxyzine on an as needed basis.  Please do not hesitate to reach out if any further questions/concerns.      Estrellita Gatica MD

## 2023-03-31 LAB
C TRACH DNA SPEC QL NAA+PROBE: NEGATIVE
HCV AB SERPL QL IA: NONREACTIVE
HIV 1+2 AB+HIV1 P24 AG SERPL QL IA: NONREACTIVE
N GONORRHOEA DNA SPEC QL NAA+PROBE: NEGATIVE

## 2023-05-21 ENCOUNTER — NURSE TRIAGE (OUTPATIENT)
Dept: NURSING | Facility: CLINIC | Age: 23
End: 2023-05-21
Payer: COMMERCIAL

## 2023-05-21 ENCOUNTER — HOSPITAL ENCOUNTER (EMERGENCY)
Facility: CLINIC | Age: 23
Discharge: HOME | End: 2023-05-21
Attending: PHYSICIAN ASSISTANT | Admitting: PHYSICIAN ASSISTANT
Payer: COMMERCIAL

## 2023-05-21 VITALS
TEMPERATURE: 96.9 F | SYSTOLIC BLOOD PRESSURE: 133 MMHG | RESPIRATION RATE: 18 BRPM | OXYGEN SATURATION: 99 % | DIASTOLIC BLOOD PRESSURE: 84 MMHG | HEART RATE: 63 BPM

## 2023-05-21 DIAGNOSIS — K08.89 PAIN, DENTAL: ICD-10-CM

## 2023-05-21 LAB — HCG UR QL: NEGATIVE

## 2023-05-21 PROCEDURE — 81025 URINE PREGNANCY TEST: CPT | Performed by: PHYSICIAN ASSISTANT

## 2023-05-21 PROCEDURE — 99284 EMERGENCY DEPT VISIT MOD MDM: CPT | Mod: 25

## 2023-05-21 PROCEDURE — 250N000013 HC RX MED GY IP 250 OP 250 PS 637: Performed by: PHYSICIAN ASSISTANT

## 2023-05-21 PROCEDURE — 64400 NJX AA&/STRD TRIGEMINAL NRV: CPT

## 2023-05-21 RX ORDER — ACETAMINOPHEN AND CODEINE PHOSPHATE 300; 30 MG/1; MG/1
1-2 TABLET ORAL EVERY 6 HOURS PRN
Qty: 12 TABLET | Refills: 0 | Status: SHIPPED | OUTPATIENT
Start: 2023-05-21 | End: 2023-05-24

## 2023-05-21 RX ORDER — OXYCODONE HYDROCHLORIDE 5 MG/1
10 TABLET ORAL ONCE
Status: COMPLETED | OUTPATIENT
Start: 2023-05-21 | End: 2023-05-21

## 2023-05-21 RX ORDER — IBUPROFEN 800 MG/1
800 TABLET, FILM COATED ORAL EVERY 8 HOURS PRN
Qty: 21 TABLET | Refills: 0 | Status: SHIPPED | OUTPATIENT
Start: 2023-05-21 | End: 2023-05-28

## 2023-05-21 RX ADMIN — OXYCODONE HYDROCHLORIDE 10 MG: 5 TABLET ORAL at 17:38

## 2023-05-21 ASSESSMENT — ACTIVITIES OF DAILY LIVING (ADL): ADLS_ACUITY_SCORE: 33

## 2023-05-21 NOTE — TELEPHONE ENCOUNTER
"Triage call\"    Patient calling to report concerns of a \"severe toothache.\"  Patient reports this started last night.  The patient reports the pain is radiating up and down the L side of her jaw to her L ear.  The patient is unable to tell if she has any facial swelling because she states she is \"round.\"  The patient reports using ibuprofen, but denies that it is effective for pain control.    The patient denies having a recent tooth extraction or injury.    Per protocol, writer advised patient to go to ED/C.    Gena Lott RN on 5/21/2023 at 3:00 PM      Reason for Disposition    [1] SEVERE pain (e.g., excruciating, unable to do any normal activities) AND [2] not improved 2 hours after pain medicine    Additional Information    Negative: Shock suspected (e.g., cold/pale/clammy skin, too weak to stand, low BP, rapid pulse)    Negative: [1] Similar pain previously AND [2] it was from \"heart attack\"    Negative: [1] Similar pain previously AND [2] it was from \"angina\" AND [3] not relieved by nitroglycerin    Negative: Sounds like a life-threatening emergency to the triager    Negative: Tongue is very swollen and tender    Negative: [1] Face is swollen AND [2] fever    Negative: Patient sounds very sick or weak to the triager    Protocols used: TOOTHACHE-A-AH      "

## 2023-05-21 NOTE — ED PROVIDER NOTES
History     Chief Complaint:  Dental Pain       HPI   Marilu Lozoya is a 22 year old female who has had 1 week of upper posterior molar pain.  She feels like the pain radiates to the upper and lower parts of her face.  She has not had recent dental appointment.  She denies any fevers.  It hurts when she bites down.  Sometimes she gets pain radiating to the left ear.  Patient reports she took 1000 mg of ibuprofen without relief.  She also would like to be checked for pregnancy.  She has not had her period for about 1 month.  Otherwise she is not complaining of any fevers, abdominal pain, nausea vomiting or vaginal bleeding.    Independent Historian:   None - Patient Only    Review of External Notes:      Medications:    acetaminophen-codeine (TYLENOL #3) 300-30 MG per tablet  amoxicillin-clavulanate (AUGMENTIN) 875-125 MG tablet  ibuprofen (ADVIL/MOTRIN) 800 MG tablet  albuterol (PROAIR HFA/PROVENTIL HFA/VENTOLIN HFA) 108 (90 Base) MCG/ACT inhaler  benzonatate (TESSALON) 200 MG capsule  hydrOXYzine (ATARAX) 25 MG tablet  metFORMIN (GLUCOPHAGE-XR) 500 MG 24 hr tablet        Past Medical History:    Past Medical History:   Diagnosis Date     Uncomplicated asthma        Past Surgical History:    Past Surgical History:   Procedure Laterality Date     LAPAROSCOPIC CHOLECYSTECTOMY N/A 10/15/2019    Procedure: CHOLECYSTECTOMY, LAPAROSCOPIC;  Surgeon: Michelle Martel MD;  Location:  OR        Physical Exam     Patient Vitals for the past 24 hrs:   BP Temp Temp src Pulse Resp SpO2   05/21/23 1630 133/84 96.9  F (36.1  C) Temporal 63 18 99 %        Physical Exam  Vitals reviewed.   Constitutional:       Appearance: She is not diaphoretic.   HENT:      Head: Atraumatic. No right periorbital erythema or left periorbital erythema.      Jaw: Tenderness and pain on movement present. No trismus or swelling.      Salivary Glands: Right salivary gland is not diffusely enlarged or tender. Left salivary gland is not diffusely  enlarged or tender.      Right Ear: No drainage or swelling. There is no impacted cerumen. No mastoid tenderness. No hemotympanum. Tympanic membrane is not injected, perforated, erythematous or bulging.      Left Ear: No drainage or swelling. There is no impacted cerumen. No mastoid tenderness. No hemotympanum. Tympanic membrane is not injected, perforated, erythematous or bulging.      Mouth/Throat:      Lips: Pink.      Mouth: Mucous membranes are moist.      Dentition: Dental tenderness (#16) present. No gingival swelling, dental caries, dental abscesses or gum lesions.      Pharynx: Oropharynx is clear. Uvula midline. No pharyngeal swelling, oropharyngeal exudate, posterior oropharyngeal erythema or uvula swelling.      Tonsils: No tonsillar exudate or tonsillar abscesses. 0 on the right. 0 on the left.        Comments: No stridor, drooling, hoarse voice, elevated tounge  Eyes:      General: No scleral icterus.     Extraocular Movements:      Right eye: Normal extraocular motion.      Left eye: Normal extraocular motion.      Conjunctiva/sclera: Conjunctivae normal.      Pupils: Pupils are equal, round, and reactive to light.   Cardiovascular:      Rate and Rhythm: Normal rate and regular rhythm.      Heart sounds: No murmur heard.     No friction rub. No gallop.   Pulmonary:      Effort: Pulmonary effort is normal.      Breath sounds: Normal breath sounds.   Musculoskeletal:      Cervical back: Normal range of motion and neck supple.   Lymphadenopathy:      Cervical: No cervical adenopathy.      Right cervical: No superficial cervical adenopathy.     Left cervical: No superficial cervical adenopathy.   Skin:     Coloration: Skin is not cyanotic or pale.      Findings: No abscess or erythema.   Neurological:      Mental Status: She is alert and oriented to person, place, and time. Mental status is at baseline.      Cranial Nerves: No cranial nerve deficit, dysarthria or facial asymmetry.      Sensory: Sensation  is intact.      Motor: Motor function is intact.       Emergency Department Course     Imaging:  No orders to display        Laboratory:  Labs Ordered and Resulted from Time of ED Arrival to Time of ED Departure   HCG QUALITATIVE URINE - Normal       Result Value    hCG Urine Qualitative Negative          Procedures       Dental Block     Procedure: Dental Block  Indication: Toothache/jaw pain  Consent: Verbal  Location: #17: L lower third molar   Procedure Detail: 1.8 cc of bupivacaine 0.5% with epinephrine was injected via Supraperiosteal block:  Good visualization and identification of landmarks is achieved. Anesthetic is injected using a 27g needle into the muccobuccal fold superjacent to the affected tooth.  Patient Status: The patient tolerated the procedure well: Yes. There were no complications.      Emergency Department Course & Assessments:             Interventions:  Medications   oxyCODONE (ROXICODONE) tablet 10 mg (10 mg Oral $Given 5/21/23 5406)        Assessments:   Patient reports still continues to have pain aside from dental block.     Independent Interpretation (X-rays, CTs, rhythm strip):  None    Consultations/Discussion of Management or Tests:  None        Social Determinants of Health affecting care:   None    Disposition:  The patient was discharged to home.     Impression & Plan    CMS Diagnoses: None    Medical Decision Making:    This is a 22-year-old female that presents with a cute onset of dental pain that radiates to the left side of her face.  She has been utilizing ibuprofen at home without relief.  She also requested pregnancy testing this was negative today.  For pain relief in the emergency department patient was given oxycodone and dental block today, patient reports continued pain with these efforts. There is no clinical evidence of otitis media, mastoiditis, peritonsillar abscess, retropharyngeal abscess, Lemierre's Syndrome, epiglottis, or Anup's angina.  The pain seems to  be from a dental source as her pain is reproduced when I palpate her left molar #16.  There is no surrounding swelling or inflammation in this area.  There is no significant obvious dental caries.  Patient will be treated with Augmentin, Tylenol with codeine and ibuprofen.  Toradol was not given today because she took 1000 mg ibuprofen already.  I recommend she hold off on any ibuprofen today and utilize the Tylenol with codeine and take the antibiotic.  Dental resources were given to the patient she should follow-up with a dentist this coming week.    Return if increasing pain, change in voice, neck pain, vomiting, fever, or shortness of breath.     Diagnosis:    ICD-10-CM    1. Pain, dental  K08.89          Discharge Medications:  New Prescriptions    ACETAMINOPHEN-CODEINE (TYLENOL #3) 300-30 MG PER TABLET    Take 1-2 tablets by mouth every 6 hours as needed for severe pain    AMOXICILLIN-CLAVULANATE (AUGMENTIN) 875-125 MG TABLET    Take 1 tablet by mouth 2 times daily for 7 days    IBUPROFEN (ADVIL/MOTRIN) 800 MG TABLET    Take 1 tablet (800 mg) by mouth every 8 hours as needed for moderate pain (with food)      5/21/2023   Brandon Nolen PA-C Kruger, Jacob C, PA-C  05/21/23 1800

## 2023-05-21 NOTE — ED TRIAGE NOTES
Presents to ED with dental pain pain radiates to the left side of her face. States the last time she was at the dentist they mentioned doing a root canal, but was unsure what the reason was, but was not having pain at the time. Taking ibuprofen.

## 2023-05-21 NOTE — LETTER
May 21, 2023      To Whom It May Concern:      Marilu Lozoya was seen in our Emergency Department today, 05/21/23.  I expect her condition to improve over the next 2 days.  She may return to work when improved.    Sincerely,        Caitlin PITTMAN RN

## 2023-10-20 ENCOUNTER — APPOINTMENT (OUTPATIENT)
Dept: ULTRASOUND IMAGING | Facility: CLINIC | Age: 23
End: 2023-10-20
Attending: EMERGENCY MEDICINE
Payer: COMMERCIAL

## 2023-10-20 ENCOUNTER — HOSPITAL ENCOUNTER (EMERGENCY)
Facility: CLINIC | Age: 23
Discharge: HOME | End: 2023-10-20
Attending: EMERGENCY MEDICINE | Admitting: EMERGENCY MEDICINE
Payer: COMMERCIAL

## 2023-10-20 VITALS
SYSTOLIC BLOOD PRESSURE: 110 MMHG | TEMPERATURE: 98.8 F | RESPIRATION RATE: 18 BRPM | HEART RATE: 94 BPM | OXYGEN SATURATION: 98 % | DIASTOLIC BLOOD PRESSURE: 63 MMHG

## 2023-10-20 DIAGNOSIS — R10.2 PELVIC CRAMPING: ICD-10-CM

## 2023-10-20 DIAGNOSIS — J06.9 VIRAL URI WITH COUGH: ICD-10-CM

## 2023-10-20 LAB
ALBUMIN UR-MCNC: NEGATIVE MG/DL
APPEARANCE UR: CLEAR
BILIRUB UR QL STRIP: NEGATIVE
COLOR UR AUTO: ABNORMAL
FLUAV RNA SPEC QL NAA+PROBE: NEGATIVE
FLUBV RNA RESP QL NAA+PROBE: NEGATIVE
GLUCOSE UR STRIP-MCNC: >=1000 MG/DL
GROUP A STREP BY PCR: NOT DETECTED
HCG INTACT+B SERPL-ACNC: 3648 MIU/ML
HGB UR QL STRIP: NEGATIVE
KETONES UR STRIP-MCNC: 100 MG/DL
LEUKOCYTE ESTERASE UR QL STRIP: NEGATIVE
MUCOUS THREADS #/AREA URNS LPF: PRESENT /LPF
NITRATE UR QL: NEGATIVE
PH UR STRIP: 6 [PH] (ref 5–7)
RBC URINE: 1 /HPF
RSV RNA SPEC NAA+PROBE: NEGATIVE
SARS-COV-2 RNA RESP QL NAA+PROBE: NEGATIVE
SP GR UR STRIP: 1.02 (ref 1–1.03)
SQUAMOUS EPITHELIAL: 1 /HPF
UROBILINOGEN UR STRIP-MCNC: NORMAL MG/DL
WBC URINE: 1 /HPF

## 2023-10-20 PROCEDURE — 99285 EMERGENCY DEPT VISIT HI MDM: CPT | Mod: 25

## 2023-10-20 PROCEDURE — 76801 OB US < 14 WKS SINGLE FETUS: CPT

## 2023-10-20 PROCEDURE — 87637 SARSCOV2&INF A&B&RSV AMP PRB: CPT | Performed by: EMERGENCY MEDICINE

## 2023-10-20 PROCEDURE — 87651 STREP A DNA AMP PROBE: CPT | Performed by: EMERGENCY MEDICINE

## 2023-10-20 PROCEDURE — 81001 URINALYSIS AUTO W/SCOPE: CPT | Performed by: EMERGENCY MEDICINE

## 2023-10-20 PROCEDURE — 36415 COLL VENOUS BLD VENIPUNCTURE: CPT | Performed by: EMERGENCY MEDICINE

## 2023-10-20 PROCEDURE — 84702 CHORIONIC GONADOTROPIN TEST: CPT | Performed by: EMERGENCY MEDICINE

## 2023-10-20 ASSESSMENT — ACTIVITIES OF DAILY LIVING (ADL): ADLS_ACUITY_SCORE: 35

## 2023-10-20 NOTE — LETTER
October 20, 2023      To Whom It May Concern:      Marilu Lozoya was seen in our Emergency Department today, 10/20/23 for a medical condition.  Please excuse her from the time she missed today.  She may return to work for her next scheduled shift.    Sincerely,            Taz Lockwood, DO

## 2023-10-20 NOTE — ED PROVIDER NOTES
History     Chief Complaint:  Flu Symptoms and Pharyngitis     The history is provided by the patient.      Marilu Lozoya is a 23 year old female who is 6 weeks pregnant with her first pregnancy and presents with flu symptoms and pharyngitis. She states that today is the 4th day of congestion, sweats, and difficulty sleeping. She adds that last night, her throat started hurting so badly that she was not able to sleep. Marilu reports an occasional productive cough as well. She states that she has been using a humidifier in her room to help her symptoms. Marilu also reports experiencing pelvic cramps without bleeding ever since the beginning of the month when she was supposed to get her period. She denies fever, prior medical issues, urinary symptoms or any known allergies.     Independent Historian:    None - Patient Only    Review of External Notes:  None    Allergies:  No Known Allergies     Physical Exam   Patient Vitals for the past 24 hrs:   BP Temp Temp src Pulse Resp SpO2   10/20/23 1347 110/63 98.8  F (37.1  C) Temporal 94 18 98 %        Physical Exam  Constitutional: Vital signs reviewed as above.   Head: No external signs of trauma. No lesions noted.  Eyes: PEERL, EOMI B/L, no pain or limitation of superior gaze  ENT:   Ears: Normal B/L TM and external canals   Nose: Noncongested, no exudates. No rhinorrhea. No FB noted   Mouth/Throat:     Mucous membranes are moist and normal.     No Oropharyngeal exudate. No oropharyngeal erythema noted.    No tonsilar swelling noted.     No uvular deviation noted.    No swelling noted on the floor of the mouth  Neck: FROM. Neck is supple  Lymphatic: No posterior cervical LAD noted.   Cardiovascular: normal rate, Regular rhythm and normal heart sounds.  No murmur heard. Equal B/L peripheral pulses.  Pulmonary/Chest: Effort normal and breath sounds normal. No respiratory distress. Patient has no wheezes. Patient has no rales.   Musculoskeletal/Extremities: No  pitting edema noted. Normal tone.  Neurological: Patient is alert and oriented to person, place, and time.   Skin: Skin is warm and dry. There is no diaphoresis noted.   Psychiatric: The patient appears calm.      Emergency Department Course       Laboratory: Imaging:   Labs Ordered and Resulted from Time of ED Arrival to Time of ED Departure   HCG QUANTITATIVE PREGNANCY - Abnormal       Result Value    hCG Quantitative 3,648 (*)    ROUTINE UA WITH MICROSCOPIC REFLEX TO CULTURE - Abnormal    Color Urine Light Yellow      Appearance Urine Clear      Glucose Urine >=1000 (*)     Bilirubin Urine Negative      Ketones Urine 100 (*)     Specific Gravity Urine 1.020      Blood Urine Negative      pH Urine 6.0      Protein Albumin Urine Negative      Urobilinogen Urine Normal      Nitrite Urine Negative      Leukocyte Esterase Urine Negative      Mucus Urine Present (*)     RBC Urine 1      WBC Urine 1      Squamous Epithelials Urine 1     INFLUENZA A/B, RSV, & SARS-COV2 PCR - Normal    Influenza A PCR Negative      Influenza B PCR Negative      RSV PCR Negative      SARS CoV2 PCR Negative     GROUP A STREPTOCOCCUS PCR THROAT SWAB - Normal    Group A strep by PCR Not Detected       US OB <14 Weeks W Transvaginal   Final Result   IMPRESSION:    1.  Single intrauterine gestational sac with estimated gestational age of 5 weeks 3 days. A fetal pole or yolk sac are not visualized likely related to the early gestational age. Recommend repeat exam in 2 weeks to confirm fetal viability.                    Procedures       Emergency Department Course & Assessments:    Interventions:  Medications - No data to display     Assessments, Independent Interpretation, Consult/Discussion of ManagementTests:  ED Course as of 10/20/23 2255   Fri Oct 20, 2023   1536 I obtained the patient's history and examined as noted above.     1652 Rechecked and updated.       Social Determinants of Health affecting care:  None    Disposition:  The patient  was discharged to home.     Impression & Plan    CMS Diagnoses: None    Code Status: No Order    Medical Decision Making:  This 23-year-old female patient presents to the ED due to upper respiratory symptoms as well as pelvic cramping.  Please see the HPI and exam for specifics.  A broad differential was considered.  Fortunately COVID/flu/RSV and strep testing were negative.  The patient's lungs were clear and I did not feel that x-ray imaging was needed.  Her serum hCG level was elevated but I did not feel that other basic metabolic panel or CBC testing was needed.  Ultrasound of the patient's pelvis demonstrates an intrauterine finding though it is early and no contents were specifically seen.  Follow-up ultrasound is recommended.  The patient notes she has a follow-up appointment with OB/GYN next Thursday.  I will encourage her to call them on Monday and discuss repeat hCG imaging.  She should return at anytime with new or worsening symptoms.  Miscarriage instructions given.  Anticipatory guidance given prior to discharge    Critical Care:  None.    Diagnosis:    ICD-10-CM    1. Pelvic cramping  R10.2       2. Viral URI with cough  J06.9            Discharge Medications:  Discharge Medication List as of 10/20/2023  6:01 PM         Scribe Disclosure:  Jenifer MCKEON, am serving as a scribe at 3:44 PM on 10/20/2023 to document services personally performed by Taz Lockwood DO based on my observations and the provider's statements to me.      10/20/2023   Taz Lockwood DO Burns, Bradley Joseph, DO  10/20/23 4639

## 2023-10-20 NOTE — ED TRIAGE NOTES
Pt arrives with c/o flu sx and sore throat for 3 days. Pt reports difficulty sleeping because it is hard to breathe with congestion. Pt reports she is about 6 weeks pregnant.     Triage Assessment (Adult)       Row Name 10/20/23 1340          Triage Assessment    Airway WDL WDL        Respiratory WDL    Respiratory WDL X;cough        Skin Circulation/Temperature WDL    Skin Circulation/Temperature WDL WDL        Cardiac WDL    Cardiac WDL WDL        Peripheral/Neurovascular WDL    Peripheral Neurovascular WDL WDL        Cognitive/Neuro/Behavioral WDL    Cognitive/Neuro/Behavioral WDL WDL

## 2023-10-20 NOTE — DISCHARGE INSTRUCTIONS
What do you do next:   Continue your home medications unless we have specifically changed them  If medications were prescribed today, take these as directed.  The below medications are safe in pregnancy :  Claritin: use as needed for runny nose/sneezing.  Oxymetazoline nasal spray: use as needed for nasal congestion. Do not use for more than 2 days.  You can use over-the-counter acetaminophen (Tylenol ) for fever or pain control as applicable to your visit today. (Do not use ibuprofen/Motrin/Aleve/Naproxen)  Acetaminophen (Tylenol): Take 500 to 1000 mg by mouth every 6 hours as needed for fever or pain.  Do not take more than 4000 total milligrams of acetaminophen-containing products in a 24-hour timeframe.  Follow up as indicated below    When do you return: If you have uncontrollable fevers, intractable vomiting, severe shortness of breath, vaginal bleeding or you soak a pad edge to edge inside of an hour, severe pelvic cramping, or any other symptoms that concern you, please return to the ED for reevaluation.    Thank you for allowing us to care for you today.

## 2023-11-08 PROCEDURE — 99284 EMERGENCY DEPT VISIT MOD MDM: CPT | Mod: 25

## 2023-11-09 ENCOUNTER — APPOINTMENT (OUTPATIENT)
Dept: ULTRASOUND IMAGING | Facility: CLINIC | Age: 23
End: 2023-11-09
Attending: EMERGENCY MEDICINE
Payer: COMMERCIAL

## 2023-11-09 ENCOUNTER — HOSPITAL ENCOUNTER (EMERGENCY)
Facility: CLINIC | Age: 23
Discharge: HOME | End: 2023-11-09
Attending: EMERGENCY MEDICINE | Admitting: EMERGENCY MEDICINE
Payer: COMMERCIAL

## 2023-11-09 ENCOUNTER — NURSE TRIAGE (OUTPATIENT)
Dept: NURSING | Facility: CLINIC | Age: 23
End: 2023-11-09

## 2023-11-09 VITALS
HEART RATE: 76 BPM | SYSTOLIC BLOOD PRESSURE: 137 MMHG | DIASTOLIC BLOOD PRESSURE: 92 MMHG | OXYGEN SATURATION: 98 % | TEMPERATURE: 98.9 F | RESPIRATION RATE: 16 BRPM

## 2023-11-09 VITALS
SYSTOLIC BLOOD PRESSURE: 142 MMHG | DIASTOLIC BLOOD PRESSURE: 82 MMHG | BODY MASS INDEX: 40.75 KG/M2 | TEMPERATURE: 97.3 F | RESPIRATION RATE: 18 BRPM | WEIGHT: 230 LBS | HEIGHT: 63 IN | OXYGEN SATURATION: 100 % | HEART RATE: 67 BPM

## 2023-11-09 DIAGNOSIS — O03.9 MISCARRIAGE: ICD-10-CM

## 2023-11-09 DIAGNOSIS — O20.0 THREATENED MISCARRIAGE: ICD-10-CM

## 2023-11-09 LAB
ABO/RH(D): NORMAL
ANION GAP SERPL CALCULATED.3IONS-SCNC: 12 MMOL/L (ref 7–15)
ANTIBODY SCREEN: NEGATIVE
BASOPHILS # BLD AUTO: 0 10E3/UL (ref 0–0.2)
BASOPHILS NFR BLD AUTO: 0 %
BUN SERPL-MCNC: 8.9 MG/DL (ref 6–20)
CALCIUM SERPL-MCNC: 9.6 MG/DL (ref 8.6–10)
CHLORIDE SERPL-SCNC: 99 MMOL/L (ref 98–107)
CREAT SERPL-MCNC: 0.49 MG/DL (ref 0.51–0.95)
DEPRECATED HCO3 PLAS-SCNC: 23 MMOL/L (ref 22–29)
EGFRCR SERPLBLD CKD-EPI 2021: >90 ML/MIN/1.73M2
EOSINOPHIL # BLD AUTO: 0.2 10E3/UL (ref 0–0.7)
EOSINOPHIL NFR BLD AUTO: 2 %
ERYTHROCYTE [DISTWIDTH] IN BLOOD BY AUTOMATED COUNT: 12.8 % (ref 10–15)
ERYTHROCYTE [DISTWIDTH] IN BLOOD BY AUTOMATED COUNT: 12.9 % (ref 10–15)
GLUCOSE SERPL-MCNC: 245 MG/DL (ref 70–99)
HCG INTACT+B SERPL-ACNC: 3468 MIU/ML
HCT VFR BLD AUTO: 37.4 % (ref 35–47)
HCT VFR BLD AUTO: 39.4 % (ref 35–47)
HGB BLD-MCNC: 12.4 G/DL (ref 11.7–15.7)
HGB BLD-MCNC: 13.3 G/DL (ref 11.7–15.7)
HOLD SPECIMEN: NORMAL
IMM GRANULOCYTES # BLD: 0.1 10E3/UL
IMM GRANULOCYTES NFR BLD: 1 %
LYMPHOCYTES # BLD AUTO: 2.6 10E3/UL (ref 0.8–5.3)
LYMPHOCYTES NFR BLD AUTO: 26 %
MCH RBC QN AUTO: 29.3 PG (ref 26.5–33)
MCH RBC QN AUTO: 29.3 PG (ref 26.5–33)
MCHC RBC AUTO-ENTMCNC: 33.2 G/DL (ref 31.5–36.5)
MCHC RBC AUTO-ENTMCNC: 33.8 G/DL (ref 31.5–36.5)
MCV RBC AUTO: 87 FL (ref 78–100)
MCV RBC AUTO: 88 FL (ref 78–100)
MONOCYTES # BLD AUTO: 0.7 10E3/UL (ref 0–1.3)
MONOCYTES NFR BLD AUTO: 7 %
NEUTROPHILS # BLD AUTO: 6.5 10E3/UL (ref 1.6–8.3)
NEUTROPHILS NFR BLD AUTO: 64 %
NRBC # BLD AUTO: 0 10E3/UL
NRBC BLD AUTO-RTO: 0 /100
PLATELET # BLD AUTO: 262 10E3/UL (ref 150–450)
PLATELET # BLD AUTO: 292 10E3/UL (ref 150–450)
POTASSIUM SERPL-SCNC: 4 MMOL/L (ref 3.4–5.3)
RBC # BLD AUTO: 4.23 10E6/UL (ref 3.8–5.2)
RBC # BLD AUTO: 4.54 10E6/UL (ref 3.8–5.2)
SODIUM SERPL-SCNC: 134 MMOL/L (ref 135–145)
SPECIMEN EXPIRATION DATE: NORMAL
WBC # BLD AUTO: 10 10E3/UL (ref 4–11)
WBC # BLD AUTO: 11.1 10E3/UL (ref 4–11)

## 2023-11-09 PROCEDURE — 88300 SURGICAL PATH GROSS: CPT | Mod: 26 | Performed by: PATHOLOGY

## 2023-11-09 PROCEDURE — 86901 BLOOD TYPING SEROLOGIC RH(D): CPT | Performed by: EMERGENCY MEDICINE

## 2023-11-09 PROCEDURE — 76801 OB US < 14 WKS SINGLE FETUS: CPT | Mod: XS

## 2023-11-09 PROCEDURE — 86850 RBC ANTIBODY SCREEN: CPT | Performed by: EMERGENCY MEDICINE

## 2023-11-09 PROCEDURE — 36415 COLL VENOUS BLD VENIPUNCTURE: CPT | Performed by: EMERGENCY MEDICINE

## 2023-11-09 PROCEDURE — 76801 OB US < 14 WKS SINGLE FETUS: CPT

## 2023-11-09 PROCEDURE — 88300 SURGICAL PATH GROSS: CPT | Mod: TC | Performed by: EMERGENCY MEDICINE

## 2023-11-09 PROCEDURE — 85027 COMPLETE CBC AUTOMATED: CPT | Mod: 91 | Performed by: EMERGENCY MEDICINE

## 2023-11-09 PROCEDURE — 99284 EMERGENCY DEPT VISIT MOD MDM: CPT | Mod: 25

## 2023-11-09 PROCEDURE — 82310 ASSAY OF CALCIUM: CPT | Performed by: EMERGENCY MEDICINE

## 2023-11-09 PROCEDURE — 85025 COMPLETE CBC W/AUTO DIFF WBC: CPT | Performed by: EMERGENCY MEDICINE

## 2023-11-09 PROCEDURE — 84702 CHORIONIC GONADOTROPIN TEST: CPT | Performed by: EMERGENCY MEDICINE

## 2023-11-09 PROCEDURE — 250N000013 HC RX MED GY IP 250 OP 250 PS 637: Performed by: EMERGENCY MEDICINE

## 2023-11-09 RX ORDER — ACETAMINOPHEN AND CODEINE PHOSPHATE 300; 30 MG/1; MG/1
1 TABLET ORAL EVERY 6 HOURS PRN
Qty: 8 TABLET | Refills: 0 | Status: SHIPPED | OUTPATIENT
Start: 2023-11-09 | End: 2023-11-12

## 2023-11-09 RX ORDER — OXYCODONE AND ACETAMINOPHEN 5; 325 MG/1; MG/1
1 TABLET ORAL ONCE
Status: COMPLETED | OUTPATIENT
Start: 2023-11-09 | End: 2023-11-09

## 2023-11-09 RX ADMIN — OXYCODONE HYDROCHLORIDE AND ACETAMINOPHEN 1 TABLET: 5; 325 TABLET ORAL at 05:11

## 2023-11-09 ASSESSMENT — ACTIVITIES OF DAILY LIVING (ADL)
ADLS_ACUITY_SCORE: 33
ADLS_ACUITY_SCORE: 33

## 2023-11-09 NOTE — ED TRIAGE NOTES
Pt here with c/o increased pain and bleeding r/t miscarriage. Pt was seen here and discharged about an hour ago. Pt states she called nurse line and was told to return to ED     Triage Assessment (Adult)       Row Name 11/09/23 0440          Triage Assessment    Airway WDL WDL        Respiratory WDL    Respiratory WDL WDL        Skin Circulation/Temperature WDL    Skin Circulation/Temperature WDL WDL        Cardiac WDL    Cardiac WDL WDL        Peripheral/Neurovascular WDL    Peripheral Neurovascular WDL WDL        Cognitive/Neuro/Behavioral WDL    Cognitive/Neuro/Behavioral WDL WDL

## 2023-11-09 NOTE — ED TRIAGE NOTES
Pelvic pain and vaginal bleeding that started today. She reports that the pain feels like period cramps and her bleeding is as heavy as her period normally would be. 4 weeks pregnant.

## 2023-11-09 NOTE — ED PROVIDER NOTES
"    History     Chief Complaint:  Miscarriage       HPI   Marilu Lozoya is a 23 year old female who is approximately 2 months pregnant presented to the ER earlier tonight due to vaginal bleeding reports that bleeding and pain have increased after her visit here and ultrasound she called the nurse line and was told to return to the ER.  The patient says she feels pressure and pain in her uterus increasing bleeding she has not had lightheadedness shortness of breath.  There is been no trauma.      Independent Historian:    Patient    Review of External Notes:  ER note from earlier tonight and nurse triage note reviewed    Medications:    acetaminophen-codeine (TYLENOL #3) 300-30 MG per tablet  albuterol (PROAIR HFA/PROVENTIL HFA/VENTOLIN HFA) 108 (90 Base) MCG/ACT inhaler  benzonatate (TESSALON) 200 MG capsule  hydrOXYzine (ATARAX) 25 MG tablet  metFORMIN (GLUCOPHAGE-XR) 500 MG 24 hr tablet        Past Medical History:    Past Medical History:   Diagnosis Date    Uncomplicated asthma        Past Surgical History:    Past Surgical History:   Procedure Laterality Date    LAPAROSCOPIC CHOLECYSTECTOMY N/A 10/15/2019    Procedure: CHOLECYSTECTOMY, LAPAROSCOPIC;  Surgeon: Michelle Martel MD;  Location:  OR          Physical Exam   Patient Vitals for the past 24 hrs:   BP Temp Temp src Pulse Resp SpO2 Height Weight   11/09/23 0439 (!) 142/82 97.3  F (36.3  C) Temporal 67 18 100 % 1.6 m (5' 3\") 104.3 kg (230 lb)        Physical Exam  General: The patient is alert, in no respiratory distress.  Patient is uncomfortable      Cardiovascular: Regular rate and rhythm. Good pulses     Respiratory: Lungs are clear.     Gastrointestinal: Abdomen soft.  Discomfort to palpation no guarding    Musculoskeletal: No gross deformity.     Skin: No rashes or petechiae.     Neurologic: The patient is alert and conversant.  Moves all extremities well    Psychiatric: The patient is non-tearful.     Emergency Department Course "     Imaging:  US OB < 14 Weeks Single   Final Result   IMPRESSION:    No evidence for intrauterine or extrauterine gestation. Gestational sac demonstrated 10/20/2023 is no longer visualized. Findings are consistent with interval spontaneous .        Report per radiology    Laboratory:  Labs Ordered and Resulted from Time of ED Arrival to Time of ED Departure   CBC WITH PLATELETS - Abnormal       Result Value    WBC Count 11.1 (*)     RBC Count 4.23      Hemoglobin 12.4      Hematocrit 37.4      MCV 88      MCH 29.3      MCHC 33.2      RDW 12.9      Platelet Count 262          Procedures       Emergency Department Course & Assessments:             Interventions:  Medications   oxyCODONE-acetaminophen (PERCOCET) 5-325 MG per tablet 1 tablet (1 tablet Oral $Given 23 0511)        Assessments:  0600 the patient was rechecked her pain is better she did pass some tissue.        Consultations/Discussion of Management or Tests:  I reviewed the patient's labs from earlier tonight her Rh type is positive.  The ultrasound did show a subchorionic hemorrhage.       Social Determinants of Health affecting care:  Stress/Adjustment Disorders     Disposition:  The patient was discharged to home.     Impression & Plan        Medical Decision Making:  The patient came back to the ER due to increased pain she reports that it is pelvic in nature it is not severe.  We discussed alternative causes such as appendicitis bowel obstruction or diverticulitis but I felt this is unlikely.  The ultrasound was reviewed by myself and the read of subchorionic hemorrhage makes me concerned about a miscarriage.  We will repeat the ultrasound her Rh type is already positive she does not require RhoGAM.  I wrote her for pain medication.  I think the patient has likely completed her miscarriage and she will follow-up with OB as an outpatient.  The patient is otherwise stable.    Diagnosis:    ICD-10-CM    1. Miscarriage  O03.9             Discharge Medications:  New Prescriptions    ACETAMINOPHEN-CODEINE (TYLENOL #3) 300-30 MG PER TABLET    Take 1 tablet by mouth every 6 hours as needed for severe pain            11/9/2023   Dalton Werner MD Farnan, Christopher M, MD  11/09/23 0601

## 2023-11-09 NOTE — LETTER
November 9, 2023      To Whom It May Concern:      Marilu Lozoya was seen in our Emergency Department today, 11/09/23. I expect her condition to improve over the next day.  She may return to work when improved.    Sincerely,        Joe Ventura RN

## 2023-11-09 NOTE — ED PROVIDER NOTES
History     Chief Complaint:  Vaginal Bleeding - Pregnant and Pelvic Pain       HPI   Marilu Lozoya is a 23 year old female who presents to the ED due to concerns for vaginal bleeding.  The patient states that yesterday morning she woke up and noted some brown discharge.  She called her OB and was told to monitor the bleeding but that if it worsens she should be seen in the emergency department.  The patient thinks she is approximately 2 months pregnant and that her last period was September 5.    The patient states that during the day the bleeding did seem to intensify from light brown discharge to eventually dark blood and more recently pink-tinged blood.  No lightheadedness or fainting.  The patient does have some nausea and some pelvic cramping.       Independent Historian:    Spouse/Partner - They report and corroborate the above HPI    Review of External Notes:  ED provider note 10/20/23: gestational sac 5w3d.    Allergies:  No Known Allergies     Physical Exam   Patient Vitals for the past 24 hrs:   BP Temp Temp src Pulse Resp SpO2   11/09/23 0216 (!) 137/92 -- -- -- 16 98 %   11/08/23 2223 (!) 144/84 98.9  F (37.2  C) Temporal 76 16 100 %        Physical Exam  Constitutional: Vital signs reviewed as above.   HENT:    Head: No external signs of trauma noted.   Eyes: Conjunctivae are normal. Pupils are equal, round, and reactive to light.   Cardiovascular:    Normal rate, regular rhythm and normal heart sounds.     Exam reveals no friction rub.     No murmur heard.  Pulmonary/Chest:    Effort normal and breath sounds normal.    No respiratory distress.    There are no wheezes.    There are no rales.   Gastrointestinal:    Soft.    Bowel sounds normal.    There is no distension.    There is no tenderness on my exam   There is no rebound or guarding.   Musculoskeletal:    Normal range of motion.    Normal Tone  Neurological: Patient is alert and oriented to person, place, and time.   Skin: Skin is warm and  dry. Patient is not diaphoretic  Psychiatric: The patient appears calm      Emergency Department Course       Laboratory: Imaging:   Labs Ordered and Resulted from Time of ED Arrival to Time of ED Departure   BASIC METABOLIC PANEL - Abnormal       Result Value    Sodium 134 (*)     Potassium 4.0      Chloride 99      Carbon Dioxide (CO2) 23      Anion Gap 12      Urea Nitrogen 8.9      Creatinine 0.49 (*)     GFR Estimate >90      Calcium 9.6      Glucose 245 (*)    HCG QUANTITATIVE PREGNANCY - Abnormal    hCG Quantitative 3,468 (*)    CBC WITH PLATELETS AND DIFFERENTIAL    WBC Count 10.0      RBC Count 4.54      Hemoglobin 13.3      Hematocrit 39.4      MCV 87      MCH 29.3      MCHC 33.8      RDW 12.8      Platelet Count 292      % Neutrophils 64      % Lymphocytes 26      % Monocytes 7      % Eosinophils 2      % Basophils 0      % Immature Granulocytes 1      NRBCs per 100 WBC 0      Absolute Neutrophils 6.5      Absolute Lymphocytes 2.6      Absolute Monocytes 0.7      Absolute Eosinophils 0.2      Absolute Basophils 0.0      Absolute Immature Granulocytes 0.1      Absolute NRBCs 0.0     TYPE AND SCREEN, ADULT    ABO/RH(D) O POS      Antibody Screen Negative      SPECIMEN EXPIRATION DATE [id number]     ABO/RH TYPE AND SCREEN     US OB 1st Trimester With Doppler   Final Result   IMPRESSION:    1. Single live intrauterine pregnancy at 6 weeks 6 days estimated gestational age based upon crown-rump length measurement on this study. The estimated date of delivery is 06/28/2024. Note, that the embryonic heart rate is relatively slow at 75 bpm. This    could be due to the early gestational age. A follow-up ultrasound could be considered in 1 week to confirm continued viability.   2. Tiny subchorionic hemorrhage.              Procedures       Emergency Department Course & Assessments:             Interventions:  Medications - No data to display     Assessments, Independent Interpretation, Consult/Discussion of  ManagementTests:  ED Course as of 11/09/23 0300   Thu Nov 09, 2023   0108 I evaluated the patient.   0206 Rechecked and updated.       Social Determinants of Health affecting care:  None    Disposition:  The patient was discharged to home.     Impression & Plan    CMS Diagnoses: None    Code Status: No Order    Medical Decision Making:    This 23 year old female presents to the ED due to Vaginal Bleeding - Pregnant and Pelvic Pain  . Please see the HPI and exam for specifics. A broad differential was considered but I was most concerned for potential miscarriage.    Based on the differential, exam, and any decision tools, the above workup was undertaken. Lab and/or imaging results were reviewed by me and are notable for normal hemoglobin and overall reassuring metabolic panel aside from elevated glucose and slightly low sodium.  The patient's serum hCG level is slightly decreased from his previous value.  Ultrasound today does demonstrate a single live intrauterine pregnancy with a detectable heart rate though its only 75 bpm.    The patient still is at risk for miscarriage and was given instructions as such.  She notes that she has an appointment with OB/GYN in the morning and I will encourage her to keep that.  She can always return with new or worsening symptoms.      Anticipatory guidance given (including specific bleeding guidelines) prior to discharge.    Critical Care:  None.    Diagnosis:    ICD-10-CM    1. Threatened miscarriage  O20.0            Discharge Medications:  Discharge Medication List as of 11/9/2023  2:12 AM             11/9/2023   Taz Lockwood, Taz Muir DO  11/09/23 0302

## 2023-11-09 NOTE — DISCHARGE INSTRUCTIONS
What do you do next:   Continue your home medications unless we have specifically changed them  If medications were prescribed today, take these as directed.  You can use over-the-counter acetaminophen (Tylenol ) for fever or pain control as applicable to your visit today.  Acetaminophen (Tylenol): Take 500 to 1000 mg by mouth every 6 hours as needed for fever or pain.  Do not take more than 4000 total milligrams of acetaminophen-containing products in a 24-hour timeframe.  Follow up as indicated below    When do you return: If you have uncontrollable pain, vaginal bleeding or you soak a pad edge to edge inside of an hour, lightheadedness/fainting, or any other symptoms that concern you, please return to the ED for reevaluation.    Thank you for allowing us to care for you today.

## 2023-11-13 LAB
PATH REPORT.COMMENTS IMP SPEC: NORMAL
PATH REPORT.COMMENTS IMP SPEC: NORMAL
PATH REPORT.FINAL DX SPEC: NORMAL
PATH REPORT.GROSS SPEC: NORMAL
PATH REPORT.MICROSCOPIC SPEC OTHER STN: NORMAL
PHOTO IMAGE: NORMAL

## 2023-11-16 ENCOUNTER — LAB REQUISITION (OUTPATIENT)
Dept: LAB | Facility: CLINIC | Age: 23
End: 2023-11-16

## 2023-11-16 DIAGNOSIS — O03.9 COMPLETE OR UNSPECIFIED SPONTANEOUS ABORTION WITHOUT COMPLICATION: ICD-10-CM

## 2023-11-16 LAB — HCG INTACT+B SERPL-ACNC: 11 MIU/ML

## 2023-11-16 PROCEDURE — 84702 CHORIONIC GONADOTROPIN TEST: CPT | Performed by: OBSTETRICS & GYNECOLOGY

## 2023-11-30 ENCOUNTER — LAB REQUISITION (OUTPATIENT)
Dept: LAB | Facility: CLINIC | Age: 23
End: 2023-11-30

## 2023-11-30 DIAGNOSIS — O03.9 COMPLETE OR UNSPECIFIED SPONTANEOUS ABORTION WITHOUT COMPLICATION: ICD-10-CM

## 2023-11-30 LAB — HCG INTACT+B SERPL-ACNC: <1 MIU/ML

## 2023-11-30 PROCEDURE — 84702 CHORIONIC GONADOTROPIN TEST: CPT | Performed by: OBSTETRICS & GYNECOLOGY

## 2024-02-25 ENCOUNTER — HOSPITAL ENCOUNTER (EMERGENCY)
Facility: CLINIC | Age: 24
Discharge: HOME | End: 2024-02-25
Attending: EMERGENCY MEDICINE | Admitting: EMERGENCY MEDICINE

## 2024-02-25 ENCOUNTER — APPOINTMENT (OUTPATIENT)
Dept: ULTRASOUND IMAGING | Facility: CLINIC | Age: 24
End: 2024-02-25
Attending: EMERGENCY MEDICINE

## 2024-02-25 VITALS
TEMPERATURE: 97.5 F | HEART RATE: 86 BPM | DIASTOLIC BLOOD PRESSURE: 81 MMHG | HEIGHT: 62 IN | RESPIRATION RATE: 17 BRPM | SYSTOLIC BLOOD PRESSURE: 126 MMHG | OXYGEN SATURATION: 97 % | WEIGHT: 237.66 LBS | BODY MASS INDEX: 43.73 KG/M2

## 2024-02-25 DIAGNOSIS — R10.9 RIGHT SIDED ABDOMINAL PAIN: ICD-10-CM

## 2024-02-25 DIAGNOSIS — R79.89 ELEVATED LFTS: ICD-10-CM

## 2024-02-25 DIAGNOSIS — K76.0 HEPATIC STEATOSIS: ICD-10-CM

## 2024-02-25 DIAGNOSIS — E11.9 NEW ONSET TYPE 2 DIABETES MELLITUS (H): ICD-10-CM

## 2024-02-25 DIAGNOSIS — R35.0 URINARY FREQUENCY: ICD-10-CM

## 2024-02-25 DIAGNOSIS — R16.0 HEPATOMEGALY: ICD-10-CM

## 2024-02-25 LAB
ALBUMIN SERPL BCG-MCNC: 4.3 G/DL (ref 3.5–5.2)
ALBUMIN UR-MCNC: NEGATIVE MG/DL
ALP SERPL-CCNC: 92 U/L (ref 40–150)
ALT SERPL W P-5'-P-CCNC: 191 U/L (ref 0–50)
ANION GAP SERPL CALCULATED.3IONS-SCNC: 14 MMOL/L (ref 7–15)
APPEARANCE UR: CLEAR
AST SERPL W P-5'-P-CCNC: 126 U/L (ref 0–45)
BACTERIA #/AREA URNS HPF: ABNORMAL /HPF
BASOPHILS # BLD AUTO: 0 10E3/UL (ref 0–0.2)
BASOPHILS NFR BLD AUTO: 0 %
BILIRUB SERPL-MCNC: 0.2 MG/DL
BILIRUB UR QL STRIP: NEGATIVE
BUN SERPL-MCNC: 10.2 MG/DL (ref 6–20)
CALCIUM SERPL-MCNC: 9.4 MG/DL (ref 8.6–10)
CHLORIDE SERPL-SCNC: 96 MMOL/L (ref 98–107)
COLOR UR AUTO: ABNORMAL
CREAT SERPL-MCNC: 0.52 MG/DL (ref 0.51–0.95)
DEPRECATED HCO3 PLAS-SCNC: 22 MMOL/L (ref 22–29)
EGFRCR SERPLBLD CKD-EPI 2021: >90 ML/MIN/1.73M2
EOSINOPHIL # BLD AUTO: 0.1 10E3/UL (ref 0–0.7)
EOSINOPHIL NFR BLD AUTO: 1 %
ERYTHROCYTE [DISTWIDTH] IN BLOOD BY AUTOMATED COUNT: 12.4 % (ref 10–15)
GLUCOSE SERPL-MCNC: 416 MG/DL (ref 70–99)
GLUCOSE UR STRIP-MCNC: >=1000 MG/DL
HBA1C MFR BLD: 8.9 %
HCG UR QL: NEGATIVE
HCT VFR BLD AUTO: 40.1 % (ref 35–47)
HGB BLD-MCNC: 13.8 G/DL (ref 11.7–15.7)
HGB UR QL STRIP: ABNORMAL
HOLD SPECIMEN: NORMAL
HOLD SPECIMEN: NORMAL
IMM GRANULOCYTES # BLD: 0.1 10E3/UL
IMM GRANULOCYTES NFR BLD: 1 %
KETONES UR STRIP-MCNC: 10 MG/DL
LEUKOCYTE ESTERASE UR QL STRIP: NEGATIVE
LYMPHOCYTES # BLD AUTO: 2 10E3/UL (ref 0.8–5.3)
LYMPHOCYTES NFR BLD AUTO: 18 %
MCH RBC QN AUTO: 28.8 PG (ref 26.5–33)
MCHC RBC AUTO-ENTMCNC: 34.4 G/DL (ref 31.5–36.5)
MCV RBC AUTO: 84 FL (ref 78–100)
MONOCYTES # BLD AUTO: 0.4 10E3/UL (ref 0–1.3)
MONOCYTES NFR BLD AUTO: 4 %
NEUTROPHILS # BLD AUTO: 8.4 10E3/UL (ref 1.6–8.3)
NEUTROPHILS NFR BLD AUTO: 76 %
NITRATE UR QL: NEGATIVE
NRBC # BLD AUTO: 0 10E3/UL
NRBC BLD AUTO-RTO: 0 /100
PH UR STRIP: 5.5 [PH] (ref 5–7)
PLATELET # BLD AUTO: 308 10E3/UL (ref 150–450)
POTASSIUM SERPL-SCNC: 4.4 MMOL/L (ref 3.4–5.3)
PROT SERPL-MCNC: 8.3 G/DL (ref 6.4–8.3)
RBC # BLD AUTO: 4.8 10E6/UL (ref 3.8–5.2)
RBC URINE: 4 /HPF
SODIUM SERPL-SCNC: 132 MMOL/L (ref 135–145)
SP GR UR STRIP: 1.03 (ref 1–1.03)
SQUAMOUS EPITHELIAL: 4 /HPF
UROBILINOGEN UR STRIP-MCNC: NORMAL MG/DL
WBC # BLD AUTO: 11 10E3/UL (ref 4–11)
WBC URINE: 2 /HPF

## 2024-02-25 PROCEDURE — 82040 ASSAY OF SERUM ALBUMIN: CPT | Performed by: EMERGENCY MEDICINE

## 2024-02-25 PROCEDURE — 81025 URINE PREGNANCY TEST: CPT | Performed by: EMERGENCY MEDICINE

## 2024-02-25 PROCEDURE — 99284 EMERGENCY DEPT VISIT MOD MDM: CPT | Mod: 25

## 2024-02-25 PROCEDURE — 81001 URINALYSIS AUTO W/SCOPE: CPT | Performed by: EMERGENCY MEDICINE

## 2024-02-25 PROCEDURE — 36415 COLL VENOUS BLD VENIPUNCTURE: CPT | Performed by: EMERGENCY MEDICINE

## 2024-02-25 PROCEDURE — 76705 ECHO EXAM OF ABDOMEN: CPT

## 2024-02-25 PROCEDURE — 85025 COMPLETE CBC W/AUTO DIFF WBC: CPT | Performed by: EMERGENCY MEDICINE

## 2024-02-25 PROCEDURE — 83036 HEMOGLOBIN GLYCOSYLATED A1C: CPT | Performed by: EMERGENCY MEDICINE

## 2024-02-25 RX ORDER — CEFTRIAXONE 2 G/1
2 INJECTION, POWDER, FOR SOLUTION INTRAMUSCULAR; INTRAVENOUS ONCE
Status: DISCONTINUED | OUTPATIENT
Start: 2024-02-25 | End: 2024-02-25

## 2024-02-25 ASSESSMENT — ACTIVITIES OF DAILY LIVING (ADL)
ADLS_ACUITY_SCORE: 33
ADLS_ACUITY_SCORE: 33
ADLS_ACUITY_SCORE: 35

## 2024-02-25 ASSESSMENT — COLUMBIA-SUICIDE SEVERITY RATING SCALE - C-SSRS
1. IN THE PAST MONTH, HAVE YOU WISHED YOU WERE DEAD OR WISHED YOU COULD GO TO SLEEP AND NOT WAKE UP?: NO
2. HAVE YOU ACTUALLY HAD ANY THOUGHTS OF KILLING YOURSELF IN THE PAST MONTH?: NO
6. HAVE YOU EVER DONE ANYTHING, STARTED TO DO ANYTHING, OR PREPARED TO DO ANYTHING TO END YOUR LIFE?: NO

## 2024-02-25 NOTE — LETTER
February 25, 2024      To Whom It May Concern:      Marilu Lozoya was seen in our Emergency Department today, 02/25/24.   Sincerely,        Shanique MANRIQUE RN

## 2024-02-26 NOTE — ED PROVIDER NOTES
"  History     Chief Complaint:  Abdominal Pain       The history is provided by the patient.      Marilu Lozoya is a 23 year old female who presents with abdominal pain. Patient had urinary symptoms last week and thought it was a yeast infection at first. She took OTC monistat but her symptoms did not go way and was later diagnosed with a UTI. She has been on nitrofurantoin since Wednesday. Patient now has left and right sided abdominal pain with bloating. She had to leave work early today due to dizziness and nausea. She is now concerned that her UTI spread and she might have a kidney infection. She believes she might have had blood in her urine but does not know if that was menstrual blood as she is on her period currently. She endorses diarrhea. Patient also has lower back pain that went away after she began taking the antibiotics. She denies vaginal symptoms, or history of kidney stones. Denies chance of pregnancy as she is also on birth control. She has had a cholecystectomy and is not on daily medications.     Independent Historian:   None - Patient Only    Review of External Notes:   No       Medications:    Nitrofurantoin    Past Medical History:    Asthma  UTI    Past Surgical History:    Cholecystectomy     Physical Exam   Patient Vitals for the past 24 hrs:   BP Temp Temp src Pulse Resp SpO2 Height Weight   02/25/24 1940 121/81 97.5  F (36.4  C) Temporal 87 17 99 % 1.575 m (5' 2\") 107.8 kg (237 lb 10.5 oz)      Physical Exam  Resp:               Non-labored  Neuro:             Alert and cooperative  MSkel:             Moving all extremities  Skin:                No rash  GI:                   No abdominal tenderness  :                  Points to R CVA but states feeling better now.    Emergency Department Course   Imaging:  US Abdomen Limited (RUQ)   Final Result   IMPRESSION:   1.  Hepatomegaly and diffuse hepatic steatosis.               Laboratory:  Labs Ordered and Resulted from Time of ED " Arrival to Time of ED Departure   COMPREHENSIVE METABOLIC PANEL - Abnormal       Result Value    Sodium 132 (*)     Potassium 4.4      Carbon Dioxide (CO2) 22      Anion Gap 14      Urea Nitrogen 10.2      Creatinine 0.52      GFR Estimate >90      Calcium 9.4      Chloride 96 (*)     Glucose 416 (*)     Alkaline Phosphatase 92       (*)      (*)     Protein Total 8.3      Albumin 4.3      Bilirubin Total 0.2     ROUTINE UA WITH MICROSCOPIC REFLEX TO CULTURE - Abnormal    Color Urine Light Yellow      Appearance Urine Clear      Glucose Urine >=1000 (*)     Bilirubin Urine Negative      Ketones Urine 10 (*)     Specific Gravity Urine 1.035      Blood Urine Large (*)     pH Urine 5.5      Protein Albumin Urine Negative      Urobilinogen Urine Normal      Nitrite Urine Negative      Leukocyte Esterase Urine Negative      Bacteria Urine Few (*)     RBC Urine 4 (*)     WBC Urine 2      Squamous Epithelials Urine 4 (*)    CBC WITH PLATELETS AND DIFFERENTIAL - Abnormal    WBC Count 11.0      RBC Count 4.80      Hemoglobin 13.8      Hematocrit 40.1      MCV 84      MCH 28.8      MCHC 34.4      RDW 12.4      Platelet Count 308      % Neutrophils 76      % Lymphocytes 18      % Monocytes 4      % Eosinophils 1      % Basophils 0      % Immature Granulocytes 1      NRBCs per 100 WBC 0      Absolute Neutrophils 8.4 (*)     Absolute Lymphocytes 2.0      Absolute Monocytes 0.4      Absolute Eosinophils 0.1      Absolute Basophils 0.0      Absolute Immature Granulocytes 0.1      Absolute NRBCs 0.0     HEMOGLOBIN A1C - Abnormal    Hemoglobin A1C 8.9 (*)    HCG QUALITATIVE URINE - Normal    hCG Urine Qualitative Negative        Emergency Department Course & Assessments:    Interventions:  Medications - No data to display     Assessments:  2001 I obtained history and examined the patient as noted above.   2155 I rechecked and updated the patient.     Independent Interpretation (X-rays, CTs, rhythm  strip):  No    Consultations/Discussion of Management or Tests:        Social Determinants of Health affecting care:   Lack of medical insurance affecting medication choices and follow up care    Disposition:  Discharge    Impression & Plan    Medical Decision Making:  Clinical concern for possible pyelonephritis given the right-sided pain in the setting of UTI symptoms.  However urinalysis showed no signs of infection.  Also no hematuria or classic colic to suggest ureteral stone.  Large amount of glucose was noted in the urine.  Very high random glucose concerning for undiagnosed diabetes.  Hemoglobin A1c was added on confirming a diabetes diagnosis.  I spoke with her about dietary and exercise changes, even 5-10 lb weight loss can have a big impact.  Mild LFT elevation was evaluated with ultrasound.  No gallstones.  I discussed enlarged liver and steatosis which I believe is the source of the elevated LFTs.  Avoid fructose.  Avoid alcohol.  Will benefit from follow-up in clinic with a recheck of blood glucose and LFTs.  She notes that she does not currently have insurance as she is pending the paperwork.  Spent more time with her because of this and going over instructions in detail.  May have some GI symptoms that she is starting metformin.  If having increasing pain, vomiting, or other concerns and return immediately for reevaluation.    Diagnosis:    ICD-10-CM    1. Right sided abdominal pain  R10.9       2. Urinary frequency  R35.0       3. Hepatomegaly  R16.0       4. Hepatic steatosis  K76.0       5. Elevated LFTs  R79.89       6. New onset type 2 diabetes mellitus (H)  E11.9            Discharge Medications:  New Prescriptions    METFORMIN (GLUCOPHAGE) 1000 MG TABLET    Take 1 tablet (1,000 mg) by mouth 2 times daily (with meals)      Scribe Disclosure:  IShanae, am serving as a scribe at 9:56 PM on 2/25/2024 to document services personally performed by Orquidea Powers MD based on my  observations and the provider's statements to me.     2/25/2024   Orquidea Powers MD Gosen, Christine Leigh, MD  02/28/24 1052

## 2024-02-26 NOTE — DISCHARGE INSTRUCTIONS
Increase fiber, protein, and healthy fats at meals.  Decrease or eliminate liquid carbs, sweets, and rapidly absorbed carbohydrates.  Avoid fructose sweeteners and alcohol.  Aim for 5-10 lb weight loss to start.  Follow-up in clinic within 2-4 weeks.      Here is one clinic you could contact:  [facility] is a community clinic that welcomes patients of every age, race, color, ethnicity and language and has served the community for over 50 years.    [facility] can help you find resources for food, shelter, health care and prescription refills. COVID-19 testing is available with a pre-screening phone call.    Call [phone] for appointments. Open Monday - Friday, 8 a.m. to 5 p.m.

## 2024-02-26 NOTE — ED TRIAGE NOTES
Pt complains of L & R sided abd pain. Pt was dx w/ UTI and put on antibiotics 2 wks ago. Pt had frequency and pain w/ urination, but now denies frequency and pain. Pt states she is still currently taking antibiotics.

## 2024-05-05 ENCOUNTER — HEALTH MAINTENANCE LETTER (OUTPATIENT)
Age: 24
End: 2024-05-05

## 2024-07-14 ENCOUNTER — HEALTH MAINTENANCE LETTER (OUTPATIENT)
Age: 24
End: 2024-07-14

## 2024-12-01 ENCOUNTER — HEALTH MAINTENANCE LETTER (OUTPATIENT)
Age: 24
End: 2024-12-01

## 2025-03-15 ENCOUNTER — HEALTH MAINTENANCE LETTER (OUTPATIENT)
Age: 25
End: 2025-03-15

## 2025-05-17 ENCOUNTER — HEALTH MAINTENANCE LETTER (OUTPATIENT)
Age: 25
End: 2025-05-17

## 2025-06-28 ENCOUNTER — HEALTH MAINTENANCE LETTER (OUTPATIENT)
Age: 25
End: 2025-06-28

## (undated) DEVICE — ENDO TROCAR FIRST ENTRY KII FIOS Z-THRD 05X100MM CTF03

## (undated) DEVICE — LINEN HALF SHEET 5512

## (undated) DEVICE — ESU GROUND PAD ADULT W/CORD E7507

## (undated) DEVICE — SUCTION IRR STRYKERFLOW II W/TIP 250-070-520

## (undated) DEVICE — ENDO TROCAR SLEEVE KII Z-THREADED 05X100MM CTS02

## (undated) DEVICE — ENDO TROCAR BLUNT TIP KII BALLOON 12X100MM C0R47

## (undated) DEVICE — SU VICRYL 0 UR-6 27" J603H

## (undated) DEVICE — ESU CORD MONOPOLAR 10'  E0510

## (undated) DEVICE — SUCTION CANISTER MEDIVAC LINER 3000ML W/LID 65651-530

## (undated) DEVICE — BLADE KNIFE SURG 11 371111

## (undated) DEVICE — LINEN TOWEL PACK X5 5464

## (undated) DEVICE — SU VICRYL 4-0 PS-2 18" UND J496H

## (undated) DEVICE — GLOVE PROTEXIS MICRO 6.5  2D73PM65

## (undated) DEVICE — SOL NACL 0.9% IRRIG 1000ML BOTTLE 2F7124

## (undated) DEVICE — ENDO POUCH UNIV RETRIEVAL SYSTEM INZII 10MM CD001

## (undated) DEVICE — Device

## (undated) DEVICE — LINEN FULL SHEET 5511

## (undated) DEVICE — NDL 22GA 1.5"

## (undated) DEVICE — GLOVE PROTEXIS BLUE W/NEU-THERA 7.0  2D73EB70

## (undated) DEVICE — ESU ELEC BLADE 2.75" COATED/INSULATED E1455

## (undated) DEVICE — LINEN POUCH DBL 5427

## (undated) DEVICE — ESU PENCIL W/HOLSTER E2350H

## (undated) DEVICE — BAG CLEAR TRASH 1.3M 39X33" P4040C

## (undated) DEVICE — CLIP APPLIER ENDO 5MM M/L LIGAMAX EL5ML

## (undated) RX ORDER — OXYCODONE HYDROCHLORIDE 5 MG/1
TABLET ORAL
Status: DISPENSED
Start: 2019-10-15

## (undated) RX ORDER — PROPOFOL 10 MG/ML
INJECTION, EMULSION INTRAVENOUS
Status: DISPENSED
Start: 2019-10-15

## (undated) RX ORDER — FENTANYL CITRATE 50 UG/ML
INJECTION, SOLUTION INTRAMUSCULAR; INTRAVENOUS
Status: DISPENSED
Start: 2019-10-15

## (undated) RX ORDER — OXYCODONE HYDROCHLORIDE 5 MG/1
TABLET ORAL
Status: DISPENSED
Start: 2023-05-21

## (undated) RX ORDER — NEOSTIGMINE METHYLSULFATE 1 MG/ML
VIAL (ML) INJECTION
Status: DISPENSED
Start: 2019-10-15

## (undated) RX ORDER — BUPIVACAINE HYDROCHLORIDE 5 MG/ML
INJECTION, SOLUTION EPIDURAL; INTRACAUDAL
Status: DISPENSED
Start: 2023-05-21

## (undated) RX ORDER — CEFAZOLIN SODIUM 2 G/100ML
INJECTION, SOLUTION INTRAVENOUS
Status: DISPENSED
Start: 2019-10-15

## (undated) RX ORDER — BUPIVACAINE HYDROCHLORIDE AND EPINEPHRINE 2.5; 5 MG/ML; UG/ML
INJECTION, SOLUTION EPIDURAL; INFILTRATION; INTRACAUDAL; PERINEURAL
Status: DISPENSED
Start: 2019-10-15

## (undated) RX ORDER — HYDROMORPHONE HYDROCHLORIDE 1 MG/ML
INJECTION, SOLUTION INTRAMUSCULAR; INTRAVENOUS; SUBCUTANEOUS
Status: DISPENSED
Start: 2019-10-15

## (undated) RX ORDER — GLYCOPYRROLATE 0.2 MG/ML
INJECTION INTRAMUSCULAR; INTRAVENOUS
Status: DISPENSED
Start: 2019-10-15